# Patient Record
Sex: FEMALE | Race: BLACK OR AFRICAN AMERICAN | NOT HISPANIC OR LATINO | URBAN - METROPOLITAN AREA
[De-identification: names, ages, dates, MRNs, and addresses within clinical notes are randomized per-mention and may not be internally consistent; named-entity substitution may affect disease eponyms.]

---

## 2017-08-29 ENCOUNTER — EMERGENCY (EMERGENCY)
Facility: HOSPITAL | Age: 42
LOS: 1 days | Discharge: OTHER INSTITUTION | End: 2017-08-29
Attending: EMERGENCY MEDICINE
Payer: MEDICAID

## 2017-08-29 VITALS
OXYGEN SATURATION: 97 % | RESPIRATION RATE: 20 BRPM | TEMPERATURE: 98 F | DIASTOLIC BLOOD PRESSURE: 74 MMHG | SYSTOLIC BLOOD PRESSURE: 132 MMHG | WEIGHT: 175.05 LBS | HEIGHT: 69 IN | HEART RATE: 78 BPM

## 2017-08-29 LAB
ALBUMIN SERPL ELPH-MCNC: 3.8 G/DL — SIGNIFICANT CHANGE UP (ref 3.3–5.2)
ALP SERPL-CCNC: 84 U/L — SIGNIFICANT CHANGE UP (ref 40–120)
ALT FLD-CCNC: 16 U/L — SIGNIFICANT CHANGE UP
AMPHET UR-MCNC: NEGATIVE — SIGNIFICANT CHANGE UP
ANION GAP SERPL CALC-SCNC: 14 MMOL/L — SIGNIFICANT CHANGE UP (ref 5–17)
APAP SERPL-MCNC: <7.5 UG/ML — LOW (ref 10–26)
APPEARANCE UR: CLEAR — SIGNIFICANT CHANGE UP
AST SERPL-CCNC: 14 U/L — SIGNIFICANT CHANGE UP
BARBITURATES UR SCN-MCNC: NEGATIVE — SIGNIFICANT CHANGE UP
BASOPHILS # BLD AUTO: 0 K/UL — SIGNIFICANT CHANGE UP (ref 0–0.2)
BASOPHILS NFR BLD AUTO: 0.4 % — SIGNIFICANT CHANGE UP (ref 0–2)
BENZODIAZ UR-MCNC: NEGATIVE — SIGNIFICANT CHANGE UP
BILIRUB SERPL-MCNC: 0.4 MG/DL — SIGNIFICANT CHANGE UP (ref 0.4–2)
BILIRUB UR-MCNC: NEGATIVE — SIGNIFICANT CHANGE UP
BUN SERPL-MCNC: 6 MG/DL — LOW (ref 8–20)
CALCIUM SERPL-MCNC: 8.7 MG/DL — SIGNIFICANT CHANGE UP (ref 8.6–10.2)
CHLORIDE SERPL-SCNC: 102 MMOL/L — SIGNIFICANT CHANGE UP (ref 98–107)
CO2 SERPL-SCNC: 23 MMOL/L — SIGNIFICANT CHANGE UP (ref 22–29)
COCAINE METAB.OTHER UR-MCNC: POSITIVE
COLOR SPEC: YELLOW — SIGNIFICANT CHANGE UP
CREAT SERPL-MCNC: 0.56 MG/DL — SIGNIFICANT CHANGE UP (ref 0.5–1.3)
DIFF PNL FLD: NEGATIVE — SIGNIFICANT CHANGE UP
EOSINOPHIL # BLD AUTO: 0.1 K/UL — SIGNIFICANT CHANGE UP (ref 0–0.5)
EOSINOPHIL NFR BLD AUTO: 1.6 % — SIGNIFICANT CHANGE UP (ref 0–6)
EPI CELLS # UR: SIGNIFICANT CHANGE UP
ETHANOL SERPL-MCNC: <10 MG/DL — SIGNIFICANT CHANGE UP
GLUCOSE SERPL-MCNC: 86 MG/DL — SIGNIFICANT CHANGE UP (ref 70–115)
GLUCOSE UR QL: NEGATIVE MG/DL — SIGNIFICANT CHANGE UP
HCG UR QL: NEGATIVE — SIGNIFICANT CHANGE UP
HCT VFR BLD CALC: 34.5 % — LOW (ref 37–47)
HGB BLD-MCNC: 11.5 G/DL — LOW (ref 12–16)
KETONES UR-MCNC: ABNORMAL
LEUKOCYTE ESTERASE UR-ACNC: ABNORMAL
LYMPHOCYTES # BLD AUTO: 2.6 K/UL — SIGNIFICANT CHANGE UP (ref 1–4.8)
LYMPHOCYTES # BLD AUTO: 36.9 % — SIGNIFICANT CHANGE UP (ref 20–55)
MCHC RBC-ENTMCNC: 30.1 PG — SIGNIFICANT CHANGE UP (ref 27–31)
MCHC RBC-ENTMCNC: 33.3 G/DL — SIGNIFICANT CHANGE UP (ref 32–36)
MCV RBC AUTO: 90.3 FL — SIGNIFICANT CHANGE UP (ref 81–99)
METHADONE UR-MCNC: NEGATIVE — SIGNIFICANT CHANGE UP
MONOCYTES # BLD AUTO: 1 K/UL — HIGH (ref 0–0.8)
MONOCYTES NFR BLD AUTO: 14 % — HIGH (ref 3–10)
NEUTROPHILS # BLD AUTO: 3.3 K/UL — SIGNIFICANT CHANGE UP (ref 1.8–8)
NEUTROPHILS NFR BLD AUTO: 47.1 % — SIGNIFICANT CHANGE UP (ref 37–73)
NITRITE UR-MCNC: NEGATIVE — SIGNIFICANT CHANGE UP
OPIATES UR-MCNC: NEGATIVE — SIGNIFICANT CHANGE UP
PCP SPEC-MCNC: SIGNIFICANT CHANGE UP
PCP UR-MCNC: NEGATIVE — SIGNIFICANT CHANGE UP
PH UR: 5 — SIGNIFICANT CHANGE UP (ref 5–8)
PLATELET # BLD AUTO: 318 K/UL — SIGNIFICANT CHANGE UP (ref 150–400)
POTASSIUM SERPL-MCNC: 3.4 MMOL/L — LOW (ref 3.5–5.3)
POTASSIUM SERPL-SCNC: 3.4 MMOL/L — LOW (ref 3.5–5.3)
PROT SERPL-MCNC: 7.2 G/DL — SIGNIFICANT CHANGE UP (ref 6.6–8.7)
PROT UR-MCNC: NEGATIVE MG/DL — SIGNIFICANT CHANGE UP
RBC # BLD: 3.82 M/UL — LOW (ref 4.4–5.2)
RBC # FLD: 14.1 % — SIGNIFICANT CHANGE UP (ref 11–15.6)
RBC CASTS # UR COMP ASSIST: NEGATIVE /HPF — SIGNIFICANT CHANGE UP (ref 0–4)
SALICYLATES SERPL-MCNC: <2 MG/DL — LOW (ref 10–20)
SODIUM SERPL-SCNC: 139 MMOL/L — SIGNIFICANT CHANGE UP (ref 135–145)
SP GR SPEC: 1.01 — SIGNIFICANT CHANGE UP (ref 1.01–1.02)
THC UR QL: POSITIVE
UROBILINOGEN FLD QL: NEGATIVE MG/DL — SIGNIFICANT CHANGE UP
WBC # BLD: 6.9 K/UL — SIGNIFICANT CHANGE UP (ref 4.8–10.8)
WBC # FLD AUTO: 6.9 K/UL — SIGNIFICANT CHANGE UP (ref 4.8–10.8)
WBC UR QL: SIGNIFICANT CHANGE UP

## 2017-08-29 PROCEDURE — 99285 EMERGENCY DEPT VISIT HI MDM: CPT

## 2017-08-29 PROCEDURE — 93010 ELECTROCARDIOGRAM REPORT: CPT

## 2017-08-29 NOTE — ED PROVIDER NOTE - PMH
Depression    Non compliance w medication regimen    Schizophrenia    Suicidal behavior  Cutting with OD in past

## 2017-08-29 NOTE — ED PROVIDER NOTE - OBJECTIVE STATEMENT
41 year old female, with hx of depression, presenting to the ED for suicidal ideation. Pt states that she wants to "hurt self all the time", but denies having any HI. She denies having any nausea, vomiting, fever, or chills. Pt states that she has no other PMHx. She denies use of tobacco, alcohol, or illicit drugs, but as per triage note, pt "smoked crack last night". No further complaints at this time.

## 2017-08-29 NOTE — ED ADULT NURSE NOTE - OBJECTIVE STATEMENT
pt a+ox4, presents to ED requesting BH eval. pt states she "isn't feeling right and needs psych eval." pt denies chest pain, SOB. pt denies recent fever, chills or night sweats. pt states she is hungry and wants to be evaluated. per day RN, pt has hx of schizophrenia and not taking medication. pt denies SI/HI. blood work, UA and EKG complete. per MD pt given meal and cleared for BH.

## 2017-08-29 NOTE — ED PROVIDER NOTE - CONSTITUTIONAL, MLM
normal... Slightly disheveled, non-toxic appearing. awake, alert, oriented to person, place, time/situation and in no apparent distress.

## 2017-08-29 NOTE — ED ADULT NURSE NOTE - CAS DISCH CONDITION
Stable/Pt alert and oriented, friendly and cooporetive. Pt 's belongings accompanied pt. No distress noted. No  hallucinations noted at this time. Denies pain. Voided  prior to leaving. Cooporative and friendly with transport team.

## 2017-08-29 NOTE — ED BEHAVIORAL HEALTH ASSESSMENT NOTE - SUMMARY
Pt. reports she has a long hx of mental illness with multiple psych hospitalizations, recently kicked out of sisters house, non compliant with meds and now homeless, c/o SI with intent and plna to "do a lot of drugs". Cannot contract for safety. Denies active AVH/delusions. Requires inpatient hospitalization.

## 2017-08-29 NOTE — ED ADULT TRIAGE NOTE - CHIEF COMPLAINT QUOTE
" I smoked crack last night and got a bad bunch , Im hungry" pt states she is homeless and watns some food denies overdose

## 2017-08-29 NOTE — ED BEHAVIORAL HEALTH ASSESSMENT NOTE - OTHER PAST PSYCHIATRIC HISTORY (INCLUDE DETAILS REGARDING ONSET, COURSE OF ILLNESS, INPATIENT/OUTPATIENT TREATMENT)
hospitalizations at [facility], [facility] and [place].  Reports long hx of mental illness.

## 2017-08-29 NOTE — ED BEHAVIORAL HEALTH ASSESSMENT NOTE - HPI (INCLUDE ILLNESS QUALITY, SEVERITY, DURATION, TIMING, CONTEXT, MODIFYING FACTORS, ASSOCIATED SIGNS AND SYMPTOMS)
41yo female, hx of schizoaffective d/o, reports being homeless since sister kicked her out, reports crack cocaine use and hasn't taken her medication in 2 weeks, denies abuse/trauma, BIBA activated by herself after having thoughts of hurting herself. She reports long hx of mental illness with multiple hospitalizations. Her main stressor is housing and negative intrusive thoughts. She denies AVH/paranoia or s/s of lia. Denies difficulty with sleep or appetite. She reports feeling depressed and intent on killing herself by "doing a lot of drugs". She cannot contract for safety and feels hopeless.

## 2017-08-30 VITALS
RESPIRATION RATE: 20 BRPM | OXYGEN SATURATION: 96 % | HEART RATE: 87 BPM | SYSTOLIC BLOOD PRESSURE: 110 MMHG | TEMPERATURE: 98 F | DIASTOLIC BLOOD PRESSURE: 75 MMHG

## 2017-08-30 PROCEDURE — 80307 DRUG TEST PRSMV CHEM ANLYZR: CPT

## 2017-08-30 PROCEDURE — 80053 COMPREHEN METABOLIC PANEL: CPT

## 2017-08-30 PROCEDURE — 36415 COLL VENOUS BLD VENIPUNCTURE: CPT

## 2017-08-30 PROCEDURE — 85027 COMPLETE CBC AUTOMATED: CPT

## 2017-08-30 PROCEDURE — 81001 URINALYSIS AUTO W/SCOPE: CPT

## 2017-08-30 PROCEDURE — 99285 EMERGENCY DEPT VISIT HI MDM: CPT | Mod: 25

## 2017-08-30 PROCEDURE — 93005 ELECTROCARDIOGRAM TRACING: CPT

## 2017-08-30 PROCEDURE — 81025 URINE PREGNANCY TEST: CPT

## 2017-08-30 RX ORDER — POTASSIUM CHLORIDE 20 MEQ
40 PACKET (EA) ORAL ONCE
Qty: 0 | Refills: 0 | Status: COMPLETED | OUTPATIENT
Start: 2017-08-30 | End: 2017-08-30

## 2017-08-30 RX ADMIN — Medication 40 MILLIEQUIVALENT(S): at 05:28

## 2017-08-30 NOTE — ED ADULT NURSE REASSESSMENT NOTE - CONDITION
improved/Pt complained of a left leg being itchy. hard, red,  round raised hot area noted on left outer thigh. Pt states she had been scratching it. Ice pack given.  Pt states she thinks she was bitten by a bug in her bed at home. Recheck redness gone. Pt states feeling better. In bed sleeping.

## 2017-08-30 NOTE — ED ADULT NURSE REASSESSMENT NOTE - GENERAL PATIENT STATE
comfortable appearance
comfortable appearance/cooperative
comfortable appearance/resting/sleeping
resting/sleeping/comfortable appearance

## 2017-08-30 NOTE — ED ADULT NURSE REASSESSMENT NOTE - NS ED NURSE REASSESS COMMENT FT1
pt refused 3am vitals, despite health teaching, in no acute distress, will continue to monitor and maintain safety.
pt currently resting offers no complaints in  no acute distress at this present time. Pt will stay overnight for bed in the morning, will continue to monitor and maintain safety.
pt arrived to , pleasant complaint/ calm and cooperative with  policy and procedure, wanded by security.  Pt states she has been smoking crack and marijuana, in no acute distress or withdrawals at this present time.  Pt states having suicidal ideations, with no plan, denies HI and denies A/V/T hallucinations.  Pt states she is depressed and has been off psych meds for a while, seeking inpatient.  No aggressive behaviors noted at this present time.  Will continue to monitor and maintain safety.
Pt. currently resting offers no complaints at this present time, in no acute distress at this time.  Will continue to monitor and maintain safety.

## 2017-08-30 NOTE — ED BEHAVIORAL HEALTH NOTE - BEHAVIORAL HEALTH NOTE
ISABELLA Note: Pt will be transferred to [facility] [place] for inpt psychiatric tx. Pt in agreement and signed vol. admission form. Accepting Dr. Adonay FELDER. Ambulance arranged with [facility]. No precert needed, straight medicaid.

## 2017-08-30 NOTE — ED ADULT NURSE REASSESSMENT NOTE - CONDITION
unchanged/Pt asleep on initial rounds. Reports she is tired and requests sleep. Denies any painand refused any comfort care at this time. Lightes dimmed. Suport and encouragement given. Awaiting placement.

## 2017-09-17 ENCOUNTER — EMERGENCY (EMERGENCY)
Facility: HOSPITAL | Age: 42
LOS: 1 days | Discharge: OTHER INSTITUTION | End: 2017-09-17
Attending: EMERGENCY MEDICINE
Payer: MEDICAID

## 2017-09-17 VITALS
SYSTOLIC BLOOD PRESSURE: 174 MMHG | WEIGHT: 175.05 LBS | HEART RATE: 91 BPM | RESPIRATION RATE: 18 BRPM | DIASTOLIC BLOOD PRESSURE: 81 MMHG | OXYGEN SATURATION: 99 % | HEIGHT: 69 IN | TEMPERATURE: 98 F

## 2017-09-17 VITALS
DIASTOLIC BLOOD PRESSURE: 65 MMHG | RESPIRATION RATE: 19 BRPM | HEART RATE: 84 BPM | SYSTOLIC BLOOD PRESSURE: 102 MMHG | OXYGEN SATURATION: 98 % | TEMPERATURE: 99 F

## 2017-09-17 LAB
ALBUMIN SERPL ELPH-MCNC: 3.8 G/DL — SIGNIFICANT CHANGE UP (ref 3.3–5.2)
ALP SERPL-CCNC: 80 U/L — SIGNIFICANT CHANGE UP (ref 40–120)
ALT FLD-CCNC: 14 U/L — SIGNIFICANT CHANGE UP
AMPHET UR-MCNC: NEGATIVE — SIGNIFICANT CHANGE UP
ANION GAP SERPL CALC-SCNC: 11 MMOL/L — SIGNIFICANT CHANGE UP (ref 5–17)
APAP SERPL-MCNC: <7.5 UG/ML — LOW (ref 10–26)
AST SERPL-CCNC: 15 U/L — SIGNIFICANT CHANGE UP
BARBITURATES UR SCN-MCNC: NEGATIVE — SIGNIFICANT CHANGE UP
BASOPHILS # BLD AUTO: 0 K/UL — SIGNIFICANT CHANGE UP (ref 0–0.2)
BASOPHILS NFR BLD AUTO: 0.6 % — SIGNIFICANT CHANGE UP (ref 0–2)
BENZODIAZ UR-MCNC: NEGATIVE — SIGNIFICANT CHANGE UP
BILIRUB SERPL-MCNC: 0.3 MG/DL — LOW (ref 0.4–2)
BUN SERPL-MCNC: 7 MG/DL — LOW (ref 8–20)
CALCIUM SERPL-MCNC: 9.3 MG/DL — SIGNIFICANT CHANGE UP (ref 8.6–10.2)
CHLORIDE SERPL-SCNC: 101 MMOL/L — SIGNIFICANT CHANGE UP (ref 98–107)
CO2 SERPL-SCNC: 26 MMOL/L — SIGNIFICANT CHANGE UP (ref 22–29)
COCAINE METAB.OTHER UR-MCNC: NEGATIVE — SIGNIFICANT CHANGE UP
CREAT SERPL-MCNC: 0.7 MG/DL — SIGNIFICANT CHANGE UP (ref 0.5–1.3)
EOSINOPHIL # BLD AUTO: 0.2 K/UL — SIGNIFICANT CHANGE UP (ref 0–0.5)
EOSINOPHIL NFR BLD AUTO: 3.2 % — SIGNIFICANT CHANGE UP (ref 0–6)
GLUCOSE SERPL-MCNC: 104 MG/DL — SIGNIFICANT CHANGE UP (ref 70–115)
HCT VFR BLD CALC: 37.5 % — SIGNIFICANT CHANGE UP (ref 37–47)
HGB BLD-MCNC: 12.5 G/DL — SIGNIFICANT CHANGE UP (ref 12–16)
LYMPHOCYTES # BLD AUTO: 2.6 K/UL — SIGNIFICANT CHANGE UP (ref 1–4.8)
LYMPHOCYTES # BLD AUTO: 47.9 % — SIGNIFICANT CHANGE UP (ref 20–55)
MCHC RBC-ENTMCNC: 29.9 PG — SIGNIFICANT CHANGE UP (ref 27–31)
MCHC RBC-ENTMCNC: 33.3 G/DL — SIGNIFICANT CHANGE UP (ref 32–36)
MCV RBC AUTO: 89.7 FL — SIGNIFICANT CHANGE UP (ref 81–99)
METHADONE UR-MCNC: NEGATIVE — SIGNIFICANT CHANGE UP
MONOCYTES # BLD AUTO: 0.7 K/UL — SIGNIFICANT CHANGE UP (ref 0–0.8)
MONOCYTES NFR BLD AUTO: 12.7 % — HIGH (ref 3–10)
NEUTROPHILS # BLD AUTO: 1.9 K/UL — SIGNIFICANT CHANGE UP (ref 1.8–8)
NEUTROPHILS NFR BLD AUTO: 35.6 % — LOW (ref 37–73)
OPIATES UR-MCNC: NEGATIVE — SIGNIFICANT CHANGE UP
PCP SPEC-MCNC: SIGNIFICANT CHANGE UP
PCP UR-MCNC: NEGATIVE — SIGNIFICANT CHANGE UP
PLATELET # BLD AUTO: 406 K/UL — HIGH (ref 150–400)
POTASSIUM SERPL-MCNC: 3.8 MMOL/L — SIGNIFICANT CHANGE UP (ref 3.5–5.3)
POTASSIUM SERPL-SCNC: 3.8 MMOL/L — SIGNIFICANT CHANGE UP (ref 3.5–5.3)
PROT SERPL-MCNC: 7.6 G/DL — SIGNIFICANT CHANGE UP (ref 6.6–8.7)
RBC # BLD: 4.18 M/UL — LOW (ref 4.4–5.2)
RBC # FLD: 14.3 % — SIGNIFICANT CHANGE UP (ref 11–15.6)
SALICYLATES SERPL-MCNC: <2 MG/DL — LOW (ref 10–20)
SODIUM SERPL-SCNC: 138 MMOL/L — SIGNIFICANT CHANGE UP (ref 135–145)
THC UR QL: POSITIVE
WBC # BLD: 5.4 K/UL — SIGNIFICANT CHANGE UP (ref 4.8–10.8)
WBC # FLD AUTO: 5.4 K/UL — SIGNIFICANT CHANGE UP (ref 4.8–10.8)

## 2017-09-17 PROCEDURE — 99285 EMERGENCY DEPT VISIT HI MDM: CPT

## 2017-09-17 PROCEDURE — 80307 DRUG TEST PRSMV CHEM ANLYZR: CPT

## 2017-09-17 PROCEDURE — 93010 ELECTROCARDIOGRAM REPORT: CPT

## 2017-09-17 PROCEDURE — 36415 COLL VENOUS BLD VENIPUNCTURE: CPT

## 2017-09-17 PROCEDURE — 99285 EMERGENCY DEPT VISIT HI MDM: CPT | Mod: 25

## 2017-09-17 PROCEDURE — 80053 COMPREHEN METABOLIC PANEL: CPT

## 2017-09-17 PROCEDURE — 85027 COMPLETE CBC AUTOMATED: CPT

## 2017-09-17 PROCEDURE — 93005 ELECTROCARDIOGRAM TRACING: CPT

## 2017-09-17 RX ORDER — DIVALPROEX SODIUM 500 MG/1
750 TABLET, DELAYED RELEASE ORAL
Qty: 0 | Refills: 0 | COMMUNITY

## 2017-09-17 RX ORDER — DIVALPROEX SODIUM 500 MG/1
1 TABLET, DELAYED RELEASE ORAL
Qty: 0 | Refills: 0 | COMMUNITY

## 2017-09-17 RX ORDER — HALOPERIDOL DECANOATE 100 MG/ML
0 INJECTION INTRAMUSCULAR
Qty: 0 | Refills: 0 | COMMUNITY

## 2017-09-17 NOTE — ED BEHAVIORAL HEALTH ASSESSMENT NOTE - OTHER PAST PSYCHIATRIC HISTORY (INCLUDE DETAILS REGARDING ONSET, COURSE OF ILLNESS, INPATIENT/OUTPATIENT TREATMENT)
Most recent psych hospitalization at [facility] in April.  Also hospitalized at [facility], [facility] and [place].  Reports long hx of mental illness.
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
## 2017-09-17 NOTE — ED ADULT NURSE NOTE - NSHISCREENINGSIGNS_ED_A_ED
socially isolated, outcast or withdrawn/victimized or treated badly by peers/reacting to disappointments, criticisms or teasing with extreme and intense anger, blame or a desire for revenge/feelings and behavior are easily influenced by peers/dwelling on experiences of rejection, on injustices or unrealistic fears

## 2017-09-17 NOTE — ED BEHAVIORAL HEALTH ASSESSMENT NOTE - DETAILS
[place] - discharged 8/30 As per HPI patient present Dr. Domínguez aware Attempted reaching Dr. Calvert [phone] ext [phone] Uncle-schizophrenia
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
## 2017-09-17 NOTE — ED ADULT NURSE NOTE - NSSISCREENINGSIGNS_ED_A_ED
command hallucinations to hurt self/social withdrawal- from friends/family/society/mood changes- often dramatic/purposeless- no reason for living/hopelessness/history of abuse/trauma/non-complaint with treatments/triggering event  (ex. pending homelessness / incarceration)/talking about suicide/anxiety/agitation/anger- uncontrolled/past suicide attempts/ family

## 2017-09-17 NOTE — ED ADULT NURSE REASSESSMENT NOTE - NS ED NURSE REASSESS COMMENT FT1
Assumed care of pt @1930. Pt reports current suicidal ideations. Pt refuses to answer any further questions at this time. As per pt, "I don't want to talk about it. I am feeling suicidal." Pt cooperative with blood draw. Pt  resting/sleeping comfortably. Comfort measures provided to pt. Will continue to monitor the pt for safety.

## 2017-09-17 NOTE — ED BEHAVIORAL HEALTH ASSESSMENT NOTE - RISK ASSESSMENT
High risk - patient has chronic psychiatric history, with current suicidal ideation with intent and not engaged in safety planning. See summary for additional information
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
## 2017-09-17 NOTE — ED BEHAVIORAL HEALTH NOTE - BEHAVIORAL HEALTH NOTE
SWNote: pt evaluated by psych NP (French) found to benefit from inpatient psych hospitalization. Worker called [facility] female bed available. Worker to fax all needed docs for case review. Worker will contacted with review outcome. SW to follow. SWNote: pt evaluated by psych NP (French) found to benefit from inpatient psych hospitalization,on 9.27 status. Worker called [facility] female bed available. Worker to fax all needed docs for case review. Worker will contacted with review outcome. SW to follow. SWNote: pt evaluated by psych NP (French) found to benefit from inpatient psych hospitalization,on 9.27 status. Worker called [facility] ([place]) female bed available. Worker to fax all needed docs for case review. Worker will contacted with review outcome. SW to follow.  Phone call from Elizabeth at [facility] requesting clarification about pt's arrest status, worker spoke with officer Ines foster [phone] whom states pt not arrested/will be given a ticket to present herself in court at the date written in the ticket,pt will have to f/u accordingly. Worker informed Elizabeth about the above,verbalized understanding.

## 2017-09-17 NOTE — ED PROVIDER NOTE - MEDICAL DECISION MAKING DETAILS
PT WITH SI VS HI. HERE WITH PD. SENT TO PSYCH FOR EVALUATION . WILL FOLLOW PT WITH SI VS HI. HERE WITH PD. SENT TO PSYCH FOR EVALUATION . WILL FOLLOW  ASPER PSYCH TRANSFER TO INPATIENT PSYCH

## 2017-09-17 NOTE — ED BEHAVIORAL HEALTH ASSESSMENT NOTE - PSYCHIATRIC ISSUES AND PLAN (INCLUDE STANDING AND PRN MEDICATION)
Depakote 750 mg twice daily. Risperidone 2 mg twice daily. Cogentin 1 mg twice daily. Haldol 5 mg PO/IM, Ativan 2 mg PO/IM, and Benadryl 50 mg PO/IM PRN Q6H for agitation.

## 2018-02-04 ENCOUNTER — EMERGENCY (EMERGENCY)
Facility: HOSPITAL | Age: 43
LOS: 1 days | Discharge: HOME | End: 2018-02-04
Attending: EMERGENCY MEDICINE | Admitting: EMERGENCY MEDICINE
Payer: MEDICAID

## 2018-02-04 VITALS
DIASTOLIC BLOOD PRESSURE: 71 MMHG | HEART RATE: 98 BPM | TEMPERATURE: 98 F | SYSTOLIC BLOOD PRESSURE: 121 MMHG | RESPIRATION RATE: 17 BRPM | OXYGEN SATURATION: 100 %

## 2018-02-04 PROCEDURE — 99282 EMERGENCY DEPT VISIT SF MDM: CPT | Mod: 25

## 2018-02-04 NOTE — ED ADULT TRIAGE NOTE - CHIEF COMPLAINT QUOTE
pt with pmh of schizophrenia states she is not "thinking correctly" states "im homeless and I am out of cigarets and I don't even have a lighter"   NP called, pt to go directly back.

## 2018-02-05 VITALS
OXYGEN SATURATION: 100 % | RESPIRATION RATE: 16 BRPM | SYSTOLIC BLOOD PRESSURE: 122 MMHG | DIASTOLIC BLOOD PRESSURE: 80 MMHG | TEMPERATURE: 98 F | HEART RATE: 90 BPM

## 2018-02-05 NOTE — PROVIDER CONTACT NOTE (OTHER) - ASSESSMENT
Pt is homeless and needs to return to shelter in [facility].  Mas Called for auth but phone busy.  senior ride called to set up transportation.  trip time is 11am, trip# [id number] and logistic care # [id number].

## 2018-02-05 NOTE — ED PROVIDER NOTE - MEDICAL DECISION MAKING DETAILS
pt requesting place to live and to see sw. Has no medical complaints. no si/hi, hallucinations or delusions requiring ED psych eval at this time. Will await SW arrival in AM. no need for bloods.

## 2018-02-05 NOTE — ED ADULT NURSE NOTE - OBJECTIVE STATEMENT
pt received to room  pt comes to ED by EMS for a "place to stay forever" pt is home less. pt denies si/hi ETOH or drugs. pt called 911 because she wants a place to stay. pt has  hx of TBI and psych hx. pt VSS pt is calm and cooperative. ED Isamar made aware of patient awaiting eval. pt undressed belongings secured by PES Otoniel. Food and blanket provided. Psych attending made aware of patient. comfort and safety measures maintained awaiting further orders Will continue to monitor the pt

## 2018-02-05 NOTE — ED ADULT NURSE REASSESSMENT NOTE - NS ED NURSE REASSESS COMMENT FT1
pt cleared medically by EDMD attending vee. pt is awaiting SW in the morning for placement. belongings returned to patient by PES pt is cleared to go to waiting as per attending. pt went to waiting room with PES

## 2018-03-01 ENCOUNTER — EMERGENCY (EMERGENCY)
Facility: HOSPITAL | Age: 43
LOS: 1 days | Discharge: HOME | End: 2018-03-01
Attending: EMERGENCY MEDICINE | Admitting: EMERGENCY MEDICINE
Payer: COMMERCIAL

## 2018-03-01 VITALS
SYSTOLIC BLOOD PRESSURE: 130 MMHG | TEMPERATURE: 98 F | DIASTOLIC BLOOD PRESSURE: 83 MMHG | OXYGEN SATURATION: 98 % | HEIGHT: 69 IN | HEART RATE: 111 BPM | RESPIRATION RATE: 18 BRPM | WEIGHT: 179.9 LBS

## 2018-03-01 PROCEDURE — 99284 EMERGENCY DEPT VISIT MOD MDM: CPT | Mod: 25

## 2018-03-01 NOTE — ED ADULT TRIAGE NOTE - CHIEF COMPLAINT QUOTE
Pt states "I've been smokin crack and reefer and wanted to kill myself, I haven't been taking my meds", admits to drinking alcohol, denies plan, denies HI at this time

## 2018-03-02 ENCOUNTER — EMERGENCY (EMERGENCY)
Facility: HOSPITAL | Age: 43
LOS: 1 days | Discharge: HOME | End: 2018-03-02
Attending: EMERGENCY MEDICINE
Payer: COMMERCIAL

## 2018-03-02 VITALS — HEART RATE: 129 BPM

## 2018-03-02 VITALS — WEIGHT: 205.91 LBS | HEIGHT: 69 IN

## 2018-03-02 VITALS
HEART RATE: 123 BPM | RESPIRATION RATE: 20 BRPM | SYSTOLIC BLOOD PRESSURE: 108 MMHG | TEMPERATURE: 99 F | OXYGEN SATURATION: 97 % | DIASTOLIC BLOOD PRESSURE: 65 MMHG

## 2018-03-02 DIAGNOSIS — F25.8 OTHER SCHIZOAFFECTIVE DISORDERS: ICD-10-CM

## 2018-03-02 LAB
ALBUMIN SERPL ELPH-MCNC: 3.4 G/DL — SIGNIFICANT CHANGE UP (ref 3.3–5.2)
ALP SERPL-CCNC: 104 U/L — SIGNIFICANT CHANGE UP (ref 40–120)
ALT FLD-CCNC: 22 U/L — SIGNIFICANT CHANGE UP
AMPHET UR-MCNC: NEGATIVE — SIGNIFICANT CHANGE UP
ANION GAP SERPL CALC-SCNC: 20 MMOL/L — HIGH (ref 5–17)
APPEARANCE UR: ABNORMAL
AST SERPL-CCNC: 23 U/L — SIGNIFICANT CHANGE UP
BACTERIA # UR AUTO: ABNORMAL
BARBITURATES UR SCN-MCNC: NEGATIVE — SIGNIFICANT CHANGE UP
BENZODIAZ UR-MCNC: NEGATIVE — SIGNIFICANT CHANGE UP
BILIRUB SERPL-MCNC: 0.4 MG/DL — SIGNIFICANT CHANGE UP (ref 0.4–2)
BILIRUB UR-MCNC: NEGATIVE — SIGNIFICANT CHANGE UP
BUN SERPL-MCNC: 6 MG/DL — LOW (ref 8–20)
CALCIUM SERPL-MCNC: 9.4 MG/DL — SIGNIFICANT CHANGE UP (ref 8.6–10.2)
CHLORIDE SERPL-SCNC: 97 MMOL/L — LOW (ref 98–107)
CK MB CFR SERPL CALC: 4 NG/ML — SIGNIFICANT CHANGE UP (ref 0–6.7)
CK SERPL-CCNC: 449 U/L — HIGH (ref 25–170)
CO2 SERPL-SCNC: 16 MMOL/L — LOW (ref 22–29)
COCAINE METAB.OTHER UR-MCNC: POSITIVE
COLOR SPEC: YELLOW — SIGNIFICANT CHANGE UP
CREAT SERPL-MCNC: 0.52 MG/DL — SIGNIFICANT CHANGE UP (ref 0.5–1.3)
DIFF PNL FLD: ABNORMAL
EOSINOPHIL NFR BLD AUTO: 2 % — SIGNIFICANT CHANGE UP (ref 0–5)
EPI CELLS # UR: ABNORMAL
ETHANOL SERPL-MCNC: <10 MG/DL — SIGNIFICANT CHANGE UP
GLUCOSE SERPL-MCNC: 97 MG/DL — SIGNIFICANT CHANGE UP (ref 70–115)
GLUCOSE UR QL: NEGATIVE MG/DL — SIGNIFICANT CHANGE UP
HCG UR QL: NEGATIVE — SIGNIFICANT CHANGE UP
HCT VFR BLD CALC: 38.4 % — SIGNIFICANT CHANGE UP (ref 37–47)
HGB BLD-MCNC: 12.5 G/DL — SIGNIFICANT CHANGE UP (ref 12–16)
KETONES UR-MCNC: ABNORMAL
LEUKOCYTE ESTERASE UR-ACNC: ABNORMAL
LYMPHOCYTES # BLD AUTO: 23 % — SIGNIFICANT CHANGE UP (ref 20–55)
MAGNESIUM SERPL-MCNC: 1.8 MG/DL — SIGNIFICANT CHANGE UP (ref 1.6–2.6)
MCHC RBC-ENTMCNC: 27.4 PG — SIGNIFICANT CHANGE UP (ref 27–31)
MCHC RBC-ENTMCNC: 32.6 G/DL — SIGNIFICANT CHANGE UP (ref 32–36)
MCV RBC AUTO: 84 FL — SIGNIFICANT CHANGE UP (ref 81–99)
METHADONE UR-MCNC: NEGATIVE — SIGNIFICANT CHANGE UP
MONOCYTES NFR BLD AUTO: 19 % — HIGH (ref 3–10)
NEUTROPHILS NFR BLD AUTO: 56 % — SIGNIFICANT CHANGE UP (ref 37–73)
NITRITE UR-MCNC: NEGATIVE — SIGNIFICANT CHANGE UP
OPIATES UR-MCNC: NEGATIVE — SIGNIFICANT CHANGE UP
PCP SPEC-MCNC: SIGNIFICANT CHANGE UP
PCP UR-MCNC: NEGATIVE — SIGNIFICANT CHANGE UP
PH UR: 6 — SIGNIFICANT CHANGE UP (ref 5–8)
PLAT MORPH BLD: NORMAL — SIGNIFICANT CHANGE UP
PLATELET # BLD AUTO: 282 K/UL — SIGNIFICANT CHANGE UP (ref 150–400)
PLATELET COUNT - ESTIMATE: ADEQUATE — SIGNIFICANT CHANGE UP
POTASSIUM SERPL-MCNC: 4 MMOL/L — SIGNIFICANT CHANGE UP (ref 3.5–5.3)
POTASSIUM SERPL-SCNC: 4 MMOL/L — SIGNIFICANT CHANGE UP (ref 3.5–5.3)
PROT SERPL-MCNC: 7.5 G/DL — SIGNIFICANT CHANGE UP (ref 6.6–8.7)
PROT UR-MCNC: NEGATIVE MG/DL — SIGNIFICANT CHANGE UP
RBC # BLD: 4.57 M/UL — SIGNIFICANT CHANGE UP (ref 4.4–5.2)
RBC # FLD: 15.1 % — SIGNIFICANT CHANGE UP (ref 11–15.6)
RBC BLD AUTO: NORMAL — SIGNIFICANT CHANGE UP
RBC CASTS # UR COMP ASSIST: SIGNIFICANT CHANGE UP /HPF (ref 0–4)
SODIUM SERPL-SCNC: 133 MMOL/L — LOW (ref 135–145)
SP GR SPEC: 1.01 — SIGNIFICANT CHANGE UP (ref 1.01–1.02)
THC UR QL: POSITIVE
TROPONIN T SERPL-MCNC: <0.01 NG/ML — SIGNIFICANT CHANGE UP (ref 0–0.06)
TSH SERPL-MCNC: 1.38 UIU/ML — SIGNIFICANT CHANGE UP (ref 0.27–4.2)
UROBILINOGEN FLD QL: NEGATIVE MG/DL — SIGNIFICANT CHANGE UP
WBC # BLD: 9.2 K/UL — SIGNIFICANT CHANGE UP (ref 4.8–10.8)
WBC # FLD AUTO: 9.2 K/UL — SIGNIFICANT CHANGE UP (ref 4.8–10.8)
WBC UR QL: ABNORMAL

## 2018-03-02 PROCEDURE — 84484 ASSAY OF TROPONIN QUANT: CPT

## 2018-03-02 PROCEDURE — 81025 URINE PREGNANCY TEST: CPT

## 2018-03-02 PROCEDURE — 80053 COMPREHEN METABOLIC PANEL: CPT

## 2018-03-02 PROCEDURE — 83735 ASSAY OF MAGNESIUM: CPT

## 2018-03-02 PROCEDURE — 36415 COLL VENOUS BLD VENIPUNCTURE: CPT

## 2018-03-02 PROCEDURE — 82550 ASSAY OF CK (CPK): CPT

## 2018-03-02 PROCEDURE — 80307 DRUG TEST PRSMV CHEM ANLYZR: CPT

## 2018-03-02 PROCEDURE — 99284 EMERGENCY DEPT VISIT MOD MDM: CPT | Mod: 25

## 2018-03-02 PROCEDURE — 99285 EMERGENCY DEPT VISIT HI MDM: CPT

## 2018-03-02 PROCEDURE — 82553 CREATINE MB FRACTION: CPT

## 2018-03-02 PROCEDURE — 85027 COMPLETE CBC AUTOMATED: CPT

## 2018-03-02 PROCEDURE — 81001 URINALYSIS AUTO W/SCOPE: CPT

## 2018-03-02 PROCEDURE — 90792 PSYCH DIAG EVAL W/MED SRVCS: CPT

## 2018-03-02 PROCEDURE — 84443 ASSAY THYROID STIM HORMONE: CPT

## 2018-03-02 RX ORDER — DIVALPROEX SODIUM 500 MG/1
500 TABLET, DELAYED RELEASE ORAL ONCE
Qty: 0 | Refills: 0 | Status: COMPLETED | OUTPATIENT
Start: 2018-03-02 | End: 2018-03-02

## 2018-03-02 RX ADMIN — DIVALPROEX SODIUM 500 MILLIGRAM(S): 500 TABLET, DELAYED RELEASE ORAL at 13:18

## 2018-03-02 RX ADMIN — Medication 1 MILLIGRAM(S): at 04:08

## 2018-03-02 NOTE — ED STATDOCS - OBJECTIVE STATEMENT
Discharged from [facility] 2 days ago to [facility];  used check to purchase crack and reefer.  Hasn't taken medications in 2 days: symbalta, depakote and haldol.  Reports feeling depressed.  "I'm homeless".

## 2018-03-02 NOTE — ED BEHAVIORAL HEALTH ASSESSMENT NOTE - RISK ASSESSMENT
Chronic risk due to recent discharge from inpatient unit and multiple suicide attempts and hx of substance abuse. Acute risk due to drug binge. However patient denies suicidal and homicidal ideation, is not acutely depressed or psychotic , displays help seeking behavior, denies having guns. Patient assessed to not be at imminent risk of harm to self or others.

## 2018-03-02 NOTE — ED BEHAVIORAL HEALTH ASSESSMENT NOTE - DESCRIPTION (FIRST USE, LAST USE, QUANTITY, FREQUENCY, DURATION)
Whenever she can borrow cigarettes Whenever she can buy cigarettes reports having  1 beer since discharge uses cannabis when she can afford reports daily cocaine use when she can afford it, unable to quantify quantity, states she spends all of her money on it

## 2018-03-02 NOTE — ED PROVIDER NOTE - PHYSICAL EXAMINATION
Constitutional : Appears comfortably, talking in full sentences  Head :NC AT , no swelling  Eyes :eomi, no swelling  Mouth :mm moist,  Neck : supple, trachea in midline  Chest :Frederick air entry, symm chest expansion, no distress  Heart :S1 S2 distant  Abdomen :abd soft, non tender  Musc/Skel :ext no swelling, no deformity, no spine tenderness, distal pulses present  Neuro  :AAO 3 no focal deficits Constitutional : Appears comfortably, in no distress, multiple marks on skin, old and new scratches, poor dentition, appears disheveled   Head :NC AT , no swelling  Eyes :eomi, no swelling  Mouth :mm dry,  Neck : supple, trachea in midline  Chest :Frederick air entry, symm chest expansion, no distress  Heart :S1 S2 distant  Abdomen :abd soft, non tender  Musc/Skel :ext no swelling, no deformity, no spine tenderness, distal pulses present  Neuro  :AAO 3 no focal deficits  Psych: no hallucinations or delusions, flat affect, following commands

## 2018-03-02 NOTE — ED BEHAVIORAL HEALTH ASSESSMENT NOTE - DETAILS
reports recent psychiatric admission with discharge 2 days ago chrissy suicide attempt via overdose / cutting Uncle-schizophrenia na [place] contacted multiple suicide attempt via overdose / cutting

## 2018-03-02 NOTE — ED BEHAVIORAL HEALTH ASSESSMENT NOTE - SUMMARY
42 year-old female, with chronic psychiatric history, with history of chronic schizoaffective disorder, bipolar type, multiple prior in-patient hospitalization and ED visits, with 15 year [place] inpatient stay discharge in 2017,  multiple prior suicide attempts and psychiatric hospitalization  Patient reports noncompliance with medications after discharge from inpatient unit for the past 2 days.  She initially reported suicdial ideation however upon evaluation by psychiatrist denies suicidal ideation and states she is looking for a place to stay.   Patient displays occasional tangential thought process and thought blocking during evaluation , likely secondary to schizoaffective disorder versus change in mental status due to withdrawal from cocaine, patinet also shows lip trembling possibly triggered by cocaine withdrawal. 42 year-old female, with chronic psychiatric history, with history of chronic schizoaffective disorder, bipolar type, multiple prior in-patient hospitalization and ED visits, with 15 year [place] inpatient stay discharge in 2017,  multiple prior suicide attempts and psychiatric hospitalization  Patient reports noncompliance with medications after discharge from inpatient unit for the past 2 days.  She initially reported suicidal ideation however upon evaluation by psychiatrist denies suicidal ideation and states she is looking for a place to stay.   Patient displays occasional tangential thought process and thought blocking during evaluation , likely secondary to schizoaffective disorder versus change in mental status due to withdrawal from cocaine, patient also shows lip trembling possibly triggered by cocaine withdrawal.

## 2018-03-02 NOTE — ED BEHAVIORAL HEALTH ASSESSMENT NOTE - MEDICATIONS (PRESCRIPTIONS, DIRECTIONS)
continue as prescribed continue as prescribed; 1 month supply of depakote 250 mg BID and Cymbalta 60 mg daily called into [facility] [address] in [place] continue as prescribed; 1 month supply of depakotedr  250 mg BID and Cymbalta 60 mg daily called into [facility] [address] in [place]

## 2018-03-02 NOTE — ED BEHAVIORAL HEALTH ASSESSMENT NOTE - OTHER PAST PSYCHIATRIC HISTORY (INCLUDE DETAILS REGARDING ONSET, COURSE OF ILLNESS, INPATIENT/OUTPATIENT TREATMENT)
multiple hospitalizations  Also hospitalized at [facility][1] and [place].  Reports long hx of mental illness- schizoaffective disorder

## 2018-03-02 NOTE — CHART NOTE - NSCHARTNOTEFT_GEN_A_CORE
ISABELLA Note: pt has [facility] appt on 3/7/18 at 10:30 a.m. ISABELLA spoke with Zuleika at [facility] ([phone]) to coordinate emergency housing at [address] [place] [address] in [place].  Intern Karla Comer set up transport via [employer] to emergency housing in [place]. Confirmation #[id number]. pt to report to [facility] on Monday 3/5.

## 2018-03-02 NOTE — ED PROVIDER NOTE - MEDICAL DECISION MAKING DETAILS
41 y/o F 41 y/o F on antipsychotics, non-compliant with medication, here for SI, will do labs consult tele-psych and re-evaluate.

## 2018-03-02 NOTE — ED ADULT NURSE REASSESSMENT NOTE - NS ED NURSE REASSESS COMMENT FT1
Dr. Wise told pt she had to drink to hydrate to lower HR to go to . Pt started spilling water on the floor.  told pt she can be d/c if she doesn't want to help herself, pt asked for the police and to leave. [facility] security @ bedside for pt d/c. Belongings returned and 1:1 dismissed.

## 2018-03-02 NOTE — ED ADULT NURSE REASSESSMENT NOTE - NS ED NURSE REASSESS COMMENT FT1
Pt continues to be tachy  , pt asleep in no acute distress at this time, Dr. Hdez was made aware.  No current interventions at this present time.  Will continue to monitor and maintain safety.

## 2018-03-02 NOTE — ED BEHAVIORAL HEALTH ASSESSMENT NOTE - OTHER
emergency housing interpersonal conflict with peers not observed flat generall goal directed, occasionally tangential, occasional thought blocking - homeless, was placed in [facility] by [facility] after recent discahrge.
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
## 2018-03-02 NOTE — ED ADULT NURSE NOTE - OBJECTIVE STATEMENT
Assumed pt care @ 0040. Pt received sitting on stretcher in NAD, in yellow gown with belongings off the stretcher. Pt AOx3 C/O being homeless and wanting to kill myself. "I am tired of no one helping me". I smoked crack, weed, beer, and cigarettes today. Pt is experiencing SI but no HI, no plan. Lungs CTA, RR even unlabored. Denies Hallucinations, Chest Pain, SOB, Nausea, Vomiting, Diarrhea. Skin warm, dry, color appropriate for age and race. Safety maintained bed locked in the lowest position with 1:1 in place for pt safety/elopement.

## 2018-03-02 NOTE — ED ADULT NURSE REASSESSMENT NOTE - NS ED NURSE REASSESS COMMENT FT1
Pt currently resting offers no complaints at this present time , in acute distress at this time.  Will continue to monitor and maintain safety.

## 2018-03-02 NOTE — ED BEHAVIORAL HEALTH ASSESSMENT NOTE - ATTENTION / CONCENTRATION
OT ACUTE Initial Evaluation                                                                                                                                                BASELINE STATUS COMPARED WITH CURRENT STATUS: Presents with ADL/IADL status below baseline , which was modified independent . Pt lives with his friend in a 2 story home with 2 steps to enter. He was using a SBQC for mobility prior to admission.     ASSESSMENT:  Patient was admitted for a L TKA and is being evaluated POD #1 by OT with focus on functional transfers/mobility to bathroom and ADLs. Current overall functional status is minimal assist due to L LE pain, cues for sequencing/safety with use of gina WW.   Patient displays  good rehab potential as evidenced by participation. Patient will benefit from further skilled OT for continued training with ADLs and functional transfers to help the patient meet goals as listed in functional data section below. Recommending discharge to home.     Objective Measures Impacting Discharge Recommendation:   No formal objective measures completed this session    RECOMMENDATIONS FOR DISCHARGE:  Recommendations for Discharge: OT: Home (10/05/17 0900)  OT Identified Barriers to Discharge: pain   Equipment:  PT/OT Mobility Equipment for Discharge: electronic order for bariatric ww sent to MD, submit to dispense bin as appropriate. Medicare form signed and in blue folder, note sent for co-sign (10/05/17 0900)       PLAN: Continue skilled OT  Treatment Plan for Next Session: DEPT- full body dressing with AE education, tub transfer, car tx vs. simulated SUV transfer, item retrieval in kitchen        Frequency Comments: M-F ORTHO (H) DEPT-bring clothing, LW s/p L TKA 10/5/17    AM-PAC Outcomes -Daily Activity Domain  How much help from another person does the patient currently need?*  Task Score  Norm   1. Putting on and taking off regular lower body clothing? 2 - A Lot   2. Bathing (including washing, rinsing,  drying)?   2 - A Lot   3. Toileting, which includes using toilet, bedpan, or urinal? 3 - A Little   4. Putting on and taking off regular upper body clothing? 4 - None   5. Taking care of personal grooming such as brushing teeth? 3 - A Little   6. Eating meals?  4 - None   Raw Score: 18/24   Converted Score:  22.86 (Raw score = 8)   G code conversion CK: 40- 59% impairment (Raw score: 13-18)   Converted score >39.4 predictive of discharge to home  *Score based on clinical judgment/expected performance, may not have been performed during this session  Scoring Guidelines  1) Patient may use assistive devices unless otherwise indicated in question  2) Do not consider help for management of medical devices only (IV poles, catheters, NG, etc) as part of assist level  3) If patient requires device that substitutes for toileting or eating function (catheter, NG tube, PEG) they do not engage in these activities and are scored as Total  4) If activity was not observed and patient unable to do the activity, select \"Total\" .  If the patient can do the activity but was not directly observed, use profession judgment to determine how much assistance needed.    Task Modification: clinical decision making of low complexity, no task modification    Diagnosis:  1. Gait instability    2. Primary osteoarthritis of left knee    3. Leg swelling        Co-morbidities:   Patient Active Problem List   Diagnosis   • Thrombocytopenia, unspecified   • HTN (hypertension)   • Hyperlipidemia   • DJD (degenerative joint disease) of knee   • Obesity, unspecified   • Spinal stenosis, lumbar region, without neurogenic claudication   • Left carotid artery stenosis   • Chronic narcotic use       Precautions:  Precautions  Knee Precautions: Other (comment) (No longer needs knee immobilizer, nerve block worn off) (10/05/17 0013)  Weight Bearing Status: Weight bearing as tolerated left lower extremity (10/04/17 1068)  Other Precautions: s/p L TKA 10/04/17  (10/04/17 1538)    Prior Living Situation:  Type of Home: House (10/04/17 1535)  Lives With: Friend(s) (10/04/17 1535)    EDUCATION:   On this date, the patient was educated on role of acute OT, ADLs and functional transfers.    The response to education was: Needs reinforcement                                                    SUBJECTIVE: Patient's Personal Goal: to return home (10/05/17 0900)           OBJECTIVE:Basic Lines: IV (10/05/17 0900)  Safety Measures: Alarms (10/05/17 0900)    RN reported Grady Fall Scale Score: 60       Last 24 hours of Functional Data     ADLs  Self Cares/ADL's  Eating Assistance: Independent (10/05/17 0900)  Upper Body Dressing Assistance: Modified independent (10/05/17 0900)  Lower Body Clothing Assistance: Maximal Assist (Max) (10/05/17 0900)  Lower Body Dressing Deficit: Don/doff L sock (10/05/17 0900)  Toileting Assistance: Minimal Assist (Min) (10/05/17 0900)  Self Cares/ADL's Comments #1: requiring increased assist with LB dressing due to pain/stiffness (10/05/17 0900)    Household mobility  Household Mobility  Bed to Chair: Minimal Assist (Min) (10/05/17 0900)  Sit to Stand: Minimal Assist (Min) (10/05/17 0900)  Stand to Sit: Minimal Assist (Min) (10/05/17 0900)  Toilet Transfers: Minimal Assist (Min) (10/05/17 0900)  Transfer Equipment: gina WW and gait belt (10/05/17 0900)  Sitting - Static: Modified Independent (10/05/17 0900)  Sitting - Dynamic: Supervision (10/05/17 0900)  Standing - Static: Touching/Steadying Assistance (10/05/17 0900)  Standing - Dynamic: Minimal Assist (Min) (10/05/17 0900)  Household Mobility Comments #1: CGA to min assist with mobility using gina WW, cues needed for sequencing/safety with WW (10/05/17 0900)    Cognition  Communication/Cognition  Communication: Clear speech (10/05/17 0900)  Overall Cognitive Status: Within Functional Limits (10/05/17 0900)    Patient's Personal Goal: to return home (10/05/17 0900)    Therapy Goals  Goals  Short Term  Goals to Be Reviewed On: 10/12/17 (10/05/17 0900)  Short Term Goals Are The Same as Discharge Goals: Yes (10/05/17 0900)  Goal Agreement: Patient agrees with goals and treatment plan (10/05/17 0900)  Dressing Discharge Goal 1: mod I, full body  (10/05/17 0900)  Toileting Discharge Goal 1: mod I (10/05/17 0900)  Home Setting Transfer Discharge Goal 1: mod I with tub transfer (10/05/17 0900)  Home Setting Transfer Discharge Goal 2: mod I with car transfer (10/05/17 0900)  Home Management Discharge Goal 1: mod I with light item retrieval (10/05/17 0900)    Interventions and Treatment Time  Treatment Interventions: ADL retraining;Functional transfer training;Equipment eval/education;Patient/Family training;Compensatory technique education (10/05/17 0900)  OT Time Spent: 45 minutes (10/05/17 0900)      See OT flowsheet for full details regarding the OT therapy provided.        Normal

## 2018-03-02 NOTE — ED BEHAVIORAL HEALTH NOTE - BEHAVIORAL HEALTH NOTE
SW Note: Met with pt to discuss discharge plans. Pt pleasant but poor historian.  Pt stated that she is homeless and has no family/friends to contact for shelter or support. Recently discharged from [facility] ( [facility]) approx. 2 days ago. She was discharged to [facility] housing and provided a referral for  counseling. She stated she left DSS housing after 1 night, no reason given, and stayed at a train station. She lost her discharge papers so she cant recall what clinic she was referred too. Called [facility] and spoke with  Ina GIORDANO [phone]. She did not have access to the pts discharge records but recalled the referral was made to [facility] and that she has CM ( Daniella Guzmán) thru them. Called [facility] but office is closed early ( most likely due to the [facility]). Made a new intake appt at [facility] for 3/7 @ 10:30 since I could not confirm her follow up. I also left msg for Hanane Yadav ( [facility]) [phone] regarding above  Pt will need to utilize DSS emergency housing when medically cleared.   Requested that Case Management dept make a Health home referral. Also could not confirm if pt is connected with a health home. Pt agreeable to any community help/support. Consent form signed for [facility].

## 2018-03-02 NOTE — ED PROVIDER NOTE - NS ED ROS FT
no weight change, no fever or chills  no recent travel, no recent abox, no sick contacts  no recent change in medications  no rash, no bruises  no visual changes no eye discharge  no cough cold or congestion,   no sob, no chest pain  no orthopnea, no pnd  no abd pain, no n/v/d  no hematuria, no change in urinary habits  no joint pain, no deformity  no headache, no paresthesia no weight change, no fever or chills  no recent travel, no recent abox, no sick contacts  non-compliant with medications   no rash, no bruises  no visual changes no eye discharge  no cough cold or congestion,   no sob, no chest pain  no orthopnea, no pnd  no abd pain, no n/v/d  no hematuria, no change in urinary habits  no joint pain, no deformity  no headache, no paresthesia  positive for SI, denies HI or auditory/visual hallucinations

## 2018-03-02 NOTE — ED BEHAVIORAL HEALTH ASSESSMENT NOTE - HPI (INCLUDE ILLNESS QUALITY, SEVERITY, DURATION, TIMING, CONTEXT, MODIFYING FACTORS, ASSOCIATED SIGNS AND SYMPTOMS)
Pt. is a 42 year-old female, with chronic psychiatric history, with history of chronic schizoaffective disorder, bipolar type, multiple prior in-patient hospitalization and ED visits, with 15 year [place] inpatient stay discharge in 2017,  multiple prior suicide attempts and psychiatric hospitalization    Patient initially reported that she was discharged from [facility] 2 days ago , however [place] and [facility] were called and do not have any record of her recently being there. She initially reported bingeing on cannabis and cocaine upon admission and reported suicidal ideation and noncompliance with her medications. Patient reports she was recently in the psychiatric hospital because she needed a place to stay, and states the help she wants from the hospital today is getting a place to stay. She denies depression , suicdial ideation, paranoia, AH, VH, homicidal ideation.   Attempted to obtain collateral from an aunt Mireya at phone number provided by patient ; number is nonfunctional. Pt. is a 42 year-old female, with chronic psychiatric history, with history of chronic schizoaffective disorder, bipolar type, multiple prior in-patient hospitalization and ED visits, with 15 year [place] inpatient stay discharge in 2017,  multiple prior suicide attempts and psychiatric hospitalization    Patient initially reported that she was discharged from [facility] 2 days ago , however [place] and [facility] were called and do not have any record of her recently being there. She initially reported bingeing on cannabis and cocaine upon admission and reported suicidal ideation and noncompliance with her medications. Patient reports she was recently in the psychiatric hospital because she needed a place to stay, and states the help she wants from the hospital today is getting a place to stay. She denies depression , suicidal ideation, paranoia, AH, VH, homicidal ideation.  She reports she lost the paper indicating where she needs to follow up for outpatient psychiatry and does not know where her medications were sent. She reports she spent her entire SSI check which is in $800 range given on first of month  on cocaine, cananbis and cigarettes and states "I'm broke". She reports drug of choice is cocaine and that she does it every day, wants to stop however declines referral to outpatient substance treatment or to rehab.  She reports she was given haldol decanoate 100 mg IM just prior to leaving [facility].   Attempted to obtain collateral from an aunt Mireya at phone number provided by patient ; number is nonfunctional. Pt. is a 42 year-old female, with chronic psychiatric history, with history of chronic schizoaffective disorder, bipolar type, multiple prior in-patient hospitalization and ED visits, with 15 year [place] inpatient stay discharge in 2017,  multiple prior suicide attempts and psychiatric hospitalization    Patient initially reported that she was discharged from [facility] 2 days ago which was confirmed by SW. COllateral from SW indicated patient does have outpatient follow up as well as a  and was referred for housing.  Patient initially reported bingeing on cannabis and cocaine upon presentation to ED and reported suicidal ideation and noncompliance with her medications. Patient reports she was recently in the psychiatric hospital because she needed a place to stay, and states the help she wants from the hospital today is getting a place to stay. She denies depression , suicidal ideation, paranoia, AH, VH, homicidal ideation.  She reports she lost the paper indicating where she needs to follow up for outpatient psychiatry and does not know where her medications were sent. She reports she spent her entire SSI check which is in $800 range given on first of month  on cocaine, cananbis and cigarettes and states "I'm broke". She reports drug of choice is cocaine and that she does it every day, wants to stop however declines referral to outpatient substance treatment or to rehab.  She reports she was given haldol decanoate 100 mg IM just prior to leaving [facility].   Attempted to obtain collateral from an aunt Mireya at phone number provided by patient ; number is nonfunctional.

## 2018-03-02 NOTE — ED ADULT NURSE NOTE - OBJECTIVE STATEMENT
Patient recently discharged from [facility]. Reportedly went to [facility] where she was discharged to, received monthly disability check and used it on illegal drugs. Came to hospital and was discharged before returning to hospital complaining of suicidal ideation. Patient states, "I need to be in the hospital so I don't kill myself and I can get back on my meds". Patient oriented to unit and assisted to room [facility]. Resting soundly a few minutes later. No distress evident.

## 2018-03-02 NOTE — ED BEHAVIORAL HEALTH ASSESSMENT NOTE - CURRENT MEDICATION
Depakote 750mg BID; Risperdal 2mg BID; Haldol 25mg BID; Cogentin 2mg BID patient reports being prescribed haldol decanoate 100 mg IM monthly, depakote 250 mg BID, cymbalta 40 mg daily.

## 2018-03-02 NOTE — ED PROVIDER NOTE - OBJECTIVE STATEMENT
41 y/o F pt with PMHx of 43 y/o F pt with PMHx of depression, schizophrenic behavior and suicidal behavior, non-compliant with medication regimen, presents to ED c/o suicidal thoughts 41 y/o F pt with PMHx of depression, schizophrenic behavior and suicidal behavior, non-compliant with medication regimen, presents to ED c/o suicidal thoughts. She states "she does not feel like living".  Pt was [facility] but states that she was discharged without her medications so she has not taken them in 2 days. She is currently taking Cymbalta, Depakote and Haldol.l 43 y/o F pt with PMHx of depression, schizophrenic behavior and suicidal behavior, non-compliant with medication regimen, presents to ED c/o suicidal thoughts. She states "she does not feel like living".  Pt was [facility] but states that she was discharged without her medications so she has not taken them in 2 days and has no place to live. She is currently taking Cymbalta, Depakote and Haldol. Denies HI or auditory/visual hallucinations. No further complaints at this time.

## 2018-03-02 NOTE — ED BEHAVIORAL HEALTH ASSESSMENT NOTE - PRIMARY DX
Schizoaffective disorder, unspecified type Deferred diagnosis on axis II Other schizoaffective disorders

## 2018-05-02 ENCOUNTER — EMERGENCY (EMERGENCY)
Facility: HOSPITAL | Age: 43
LOS: 1 days | Discharge: HOME | End: 2018-05-02
Attending: EMERGENCY MEDICINE
Payer: COMMERCIAL

## 2018-05-02 VITALS
TEMPERATURE: 99 F | DIASTOLIC BLOOD PRESSURE: 90 MMHG | HEART RATE: 93 BPM | HEIGHT: 69 IN | RESPIRATION RATE: 16 BRPM | OXYGEN SATURATION: 97 % | WEIGHT: 205.91 LBS | SYSTOLIC BLOOD PRESSURE: 120 MMHG

## 2018-05-02 LAB
ALBUMIN SERPL ELPH-MCNC: 3.9 G/DL — SIGNIFICANT CHANGE UP (ref 3.3–5.2)
ALP SERPL-CCNC: 94 U/L — SIGNIFICANT CHANGE UP (ref 40–120)
ALT FLD-CCNC: 8 U/L — SIGNIFICANT CHANGE UP
AMPHET UR-MCNC: NEGATIVE — SIGNIFICANT CHANGE UP
ANION GAP SERPL CALC-SCNC: 14 MMOL/L — SIGNIFICANT CHANGE UP (ref 5–17)
APAP SERPL-MCNC: <7.5 UG/ML — LOW (ref 10–26)
APPEARANCE UR: CLEAR — SIGNIFICANT CHANGE UP
AST SERPL-CCNC: 12 U/L — SIGNIFICANT CHANGE UP
BACTERIA # UR AUTO: ABNORMAL
BARBITURATES UR SCN-MCNC: NEGATIVE — SIGNIFICANT CHANGE UP
BASOPHILS # BLD AUTO: 0 K/UL — SIGNIFICANT CHANGE UP (ref 0–0.2)
BASOPHILS NFR BLD AUTO: 0.7 % — SIGNIFICANT CHANGE UP (ref 0–2)
BENZODIAZ UR-MCNC: NEGATIVE — SIGNIFICANT CHANGE UP
BILIRUB SERPL-MCNC: 0.3 MG/DL — LOW (ref 0.4–2)
BILIRUB UR-MCNC: NEGATIVE — SIGNIFICANT CHANGE UP
BUN SERPL-MCNC: 9 MG/DL — SIGNIFICANT CHANGE UP (ref 8–20)
CALCIUM SERPL-MCNC: 9.6 MG/DL — SIGNIFICANT CHANGE UP (ref 8.6–10.2)
CHLORIDE SERPL-SCNC: 100 MMOL/L — SIGNIFICANT CHANGE UP (ref 98–107)
CO2 SERPL-SCNC: 23 MMOL/L — SIGNIFICANT CHANGE UP (ref 22–29)
COCAINE METAB.OTHER UR-MCNC: POSITIVE
COLOR SPEC: YELLOW — SIGNIFICANT CHANGE UP
CREAT SERPL-MCNC: 0.61 MG/DL — SIGNIFICANT CHANGE UP (ref 0.5–1.3)
DIFF PNL FLD: NEGATIVE — SIGNIFICANT CHANGE UP
EOSINOPHIL # BLD AUTO: 0.1 K/UL — SIGNIFICANT CHANGE UP (ref 0–0.5)
EOSINOPHIL NFR BLD AUTO: 2.1 % — SIGNIFICANT CHANGE UP (ref 0–6)
EPI CELLS # UR: SIGNIFICANT CHANGE UP
ETHANOL SERPL-MCNC: <10 MG/DL — SIGNIFICANT CHANGE UP
GLUCOSE SERPL-MCNC: 81 MG/DL — SIGNIFICANT CHANGE UP (ref 70–115)
GLUCOSE UR QL: NEGATIVE MG/DL — SIGNIFICANT CHANGE UP
HCG UR QL: NEGATIVE — SIGNIFICANT CHANGE UP
HCT VFR BLD CALC: 38 % — SIGNIFICANT CHANGE UP (ref 37–47)
HGB BLD-MCNC: 11.8 G/DL — LOW (ref 12–16)
KETONES UR-MCNC: ABNORMAL
LEUKOCYTE ESTERASE UR-ACNC: ABNORMAL
LYMPHOCYTES # BLD AUTO: 1.7 K/UL — SIGNIFICANT CHANGE UP (ref 1–4.8)
LYMPHOCYTES # BLD AUTO: 28.5 % — SIGNIFICANT CHANGE UP (ref 20–55)
MCHC RBC-ENTMCNC: 26.2 PG — LOW (ref 27–31)
MCHC RBC-ENTMCNC: 31.1 G/DL — LOW (ref 32–36)
MCV RBC AUTO: 84.3 FL — SIGNIFICANT CHANGE UP (ref 81–99)
METHADONE UR-MCNC: NEGATIVE — SIGNIFICANT CHANGE UP
MONOCYTES # BLD AUTO: 0.8 K/UL — SIGNIFICANT CHANGE UP (ref 0–0.8)
MONOCYTES NFR BLD AUTO: 13.7 % — HIGH (ref 3–10)
NEUTROPHILS # BLD AUTO: 3.4 K/UL — SIGNIFICANT CHANGE UP (ref 1.8–8)
NEUTROPHILS NFR BLD AUTO: 54.8 % — SIGNIFICANT CHANGE UP (ref 37–73)
NITRITE UR-MCNC: NEGATIVE — SIGNIFICANT CHANGE UP
OPIATES UR-MCNC: NEGATIVE — SIGNIFICANT CHANGE UP
PCP SPEC-MCNC: SIGNIFICANT CHANGE UP
PCP UR-MCNC: NEGATIVE — SIGNIFICANT CHANGE UP
PH UR: 6.5 — SIGNIFICANT CHANGE UP (ref 5–8)
PLATELET # BLD AUTO: 319 K/UL — SIGNIFICANT CHANGE UP (ref 150–400)
POTASSIUM SERPL-MCNC: 4.4 MMOL/L — SIGNIFICANT CHANGE UP (ref 3.5–5.3)
POTASSIUM SERPL-SCNC: 4.4 MMOL/L — SIGNIFICANT CHANGE UP (ref 3.5–5.3)
PROT SERPL-MCNC: 7.6 G/DL — SIGNIFICANT CHANGE UP (ref 6.6–8.7)
PROT UR-MCNC: NEGATIVE MG/DL — SIGNIFICANT CHANGE UP
RBC # BLD: 4.51 M/UL — SIGNIFICANT CHANGE UP (ref 4.4–5.2)
RBC # FLD: 18.3 % — HIGH (ref 11–15.6)
RBC CASTS # UR COMP ASSIST: SIGNIFICANT CHANGE UP /HPF (ref 0–4)
SALICYLATES SERPL-MCNC: <0.6 MG/DL — LOW (ref 10–20)
SODIUM SERPL-SCNC: 137 MMOL/L — SIGNIFICANT CHANGE UP (ref 135–145)
SP GR SPEC: 1.01 — SIGNIFICANT CHANGE UP (ref 1.01–1.02)
THC UR QL: NEGATIVE — SIGNIFICANT CHANGE UP
TSH SERPL-MCNC: 0.76 UIU/ML — SIGNIFICANT CHANGE UP (ref 0.27–4.2)
UROBILINOGEN FLD QL: 1 MG/DL
WBC # BLD: 6.1 K/UL — SIGNIFICANT CHANGE UP (ref 4.8–10.8)
WBC # FLD AUTO: 6.1 K/UL — SIGNIFICANT CHANGE UP (ref 4.8–10.8)
WBC UR QL: SIGNIFICANT CHANGE UP

## 2018-05-02 PROCEDURE — 90792 PSYCH DIAG EVAL W/MED SRVCS: CPT

## 2018-05-02 PROCEDURE — 99213 OFFICE O/P EST LOW 20 MIN: CPT

## 2018-05-02 PROCEDURE — 99284 EMERGENCY DEPT VISIT MOD MDM: CPT

## 2018-05-02 RX ORDER — BENZTROPINE MESYLATE 1 MG
1 TABLET ORAL
Qty: 0 | Refills: 0 | Status: DISCONTINUED | OUTPATIENT
Start: 2018-05-02 | End: 2018-05-07

## 2018-05-02 RX ORDER — DIVALPROEX SODIUM 500 MG/1
500 TABLET, DELAYED RELEASE ORAL ONCE
Qty: 0 | Refills: 0 | Status: COMPLETED | OUTPATIENT
Start: 2018-05-02 | End: 2018-05-02

## 2018-05-02 RX ORDER — RISPERIDONE 4 MG/1
2 TABLET ORAL
Qty: 0 | Refills: 0 | Status: DISCONTINUED | OUTPATIENT
Start: 2018-05-02 | End: 2018-05-07

## 2018-05-02 RX ORDER — RISPERIDONE 4 MG/1
1 TABLET ORAL
Qty: 0 | Refills: 0 | COMMUNITY

## 2018-05-02 RX ORDER — BENZTROPINE MESYLATE 1 MG
1 TABLET ORAL
Qty: 60 | Refills: 0 | OUTPATIENT
Start: 2018-05-02 | End: 2018-05-31

## 2018-05-02 RX ORDER — RISPERIDONE 4 MG/1
1 TABLET ORAL
Qty: 60 | Refills: 0 | OUTPATIENT
Start: 2018-05-02 | End: 2018-05-31

## 2018-05-02 RX ORDER — DIVALPROEX SODIUM 500 MG/1
1 TABLET, DELAYED RELEASE ORAL
Qty: 60 | Refills: 0 | OUTPATIENT
Start: 2018-05-02 | End: 2018-05-31

## 2018-05-02 RX ADMIN — DIVALPROEX SODIUM 500 MILLIGRAM(S): 500 TABLET, DELAYED RELEASE ORAL at 22:05

## 2018-05-02 RX ADMIN — RISPERIDONE 2 MILLIGRAM(S): 4 TABLET ORAL at 22:05

## 2018-05-02 RX ADMIN — Medication 1 MILLIGRAM(S): at 22:05

## 2018-05-02 NOTE — ED BEHAVIORAL HEALTH ASSESSMENT NOTE - DESCRIPTION (FIRST USE, LAST USE, QUANTITY, FREQUENCY, DURATION)
Whenever she can buy cigarettes denies uses cannabis when she can afford per record denies today however utox positive, per record reports daily cocaine use when she can afford it, unable to quantify quantity, states she spends all of her money on it

## 2018-05-02 NOTE — ED ADULT NURSE NOTE - CHPI ED SYMPTOMS NEG
no agitation/no confusion/no homicidal/no disorientation/no change in level of consciousness/no hallucinations/no paranoia

## 2018-05-02 NOTE — ED BEHAVIORAL HEALTH ASSESSMENT NOTE - RISK ASSESSMENT
Chronic risk due to hx of schizoaffective disorder, multiple suicide attempts and hx of substance abuse. Acute risk due to cocaine use. However patient denies suicidal and homicidal ideation, is not acutely depressed or psychotic , displays help seeking behavior, denies having guns. Patient assessed to not be at imminent risk of harm to self or others.

## 2018-05-02 NOTE — ED BEHAVIORAL HEALTH ASSESSMENT NOTE - SUMMARY
42 year-old female, with chronic psychiatric history, with history of chronic schizoaffective disorder, bipolar type, multiple prior in-patient hospitalization and ED visits, with 15 year [place] inpatient stay discharge in 2017,  multiple prior suicide attempts and psychiatric hospitalization  Patient reports compliance with medications , requests for refill to be sent to pharmacy and is requesting assistance with getting housing.

## 2018-05-02 NOTE — ED PROVIDER NOTE - PROGRESS NOTE DETAILS
holding discharge, pending SW and DSS in morning for housing recd sign out  await , dc papers printed  patient remained stable, night was uneventful,

## 2018-05-02 NOTE — ED ADULT NURSE NOTE - CAS DISCH TRANSFER METHOD
Transportation service/Bus tickets given Bus tickets given- Shown bus stop in front of Hospital/Transportation service

## 2018-05-02 NOTE — ED BEHAVIORAL HEALTH ASSESSMENT NOTE - CURRENT MEDICATION
patient reports being prescribed haldol decanoate 100 mg IM monthly, depakote 250 mg BID, cymbalta 40 mg daily. risperidone 2 mg BID
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
## 2018-05-02 NOTE — ED BEHAVIORAL HEALTH ASSESSMENT NOTE - MEDICATIONS (PRESCRIPTIONS, DIRECTIONS)
continue as prescribed; 1 month supply of depakotedr  250 mg BID and Cymbalta 60 mg daily called into [facility] [id number] in [place]

## 2018-05-02 NOTE — ED ADULT TRIAGE NOTE - CHIEF COMPLAINT QUOTE
Pt BIBA requesting a psych eval. Pt states she is hungry and requesting to speak to a psychiatrist. Denies SI or HI. Dr. Allen at bedside, pt cleared for .
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
## 2018-05-02 NOTE — ED ADULT NURSE NOTE - CINV DISCH MEDS REVIEWED YN
Yes/Meds filled at hospital Pharmacy. Unable to fill Risperidone  as she just filled in own pharmacy Yes/Meds filled at hospital Pharmacy. Unable to fill Risperidone  as she just filled in own pharmacy. Pt made aware by nurse and pharmacist.
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
## 2018-05-02 NOTE — ED ADULT NURSE NOTE - CAS DISCH CONDITION
Stable Pt walked to [facility] to  RX[facility].  Walked to front entrance. Support and encouragement given. Pt grees to take bus directly to [facility], will follow with [facility] on Monday . Denies S/I and H/I, no psychosis observed. All belonging returned./Stable

## 2018-05-02 NOTE — ED BEHAVIORAL HEALTH ASSESSMENT NOTE - OTHER PAST PSYCHIATRIC HISTORY (INCLUDE DETAILS REGARDING ONSET, COURSE OF ILLNESS, INPATIENT/OUTPATIENT TREATMENT)
multiple hospitalizations  Also hospitalized at [facility] and [facility].  Reports long hx of mental illness- schizoaffective disorder

## 2018-05-02 NOTE — ED ADULT NURSE NOTE - SUBSTANCE USE COMMENT, PROFILE
used crack cocaine alast not after years of abstainace used crack cocaine last night for one day relapse after years being clean

## 2018-05-03 VITALS
RESPIRATION RATE: 17 BRPM | DIASTOLIC BLOOD PRESSURE: 85 MMHG | HEART RATE: 72 BPM | SYSTOLIC BLOOD PRESSURE: 125 MMHG | OXYGEN SATURATION: 97 % | TEMPERATURE: 98 F

## 2018-05-03 PROCEDURE — 36415 COLL VENOUS BLD VENIPUNCTURE: CPT

## 2018-05-03 PROCEDURE — 84443 ASSAY THYROID STIM HORMONE: CPT

## 2018-05-03 PROCEDURE — 81001 URINALYSIS AUTO W/SCOPE: CPT

## 2018-05-03 PROCEDURE — 80053 COMPREHEN METABOLIC PANEL: CPT

## 2018-05-03 PROCEDURE — 99284 EMERGENCY DEPT VISIT MOD MDM: CPT

## 2018-05-03 PROCEDURE — 99211 OFF/OP EST MAY X REQ PHY/QHP: CPT

## 2018-05-03 PROCEDURE — 85027 COMPLETE CBC AUTOMATED: CPT

## 2018-05-03 PROCEDURE — 81025 URINE PREGNANCY TEST: CPT

## 2018-05-03 PROCEDURE — 80307 DRUG TEST PRSMV CHEM ANLYZR: CPT

## 2018-05-03 RX ORDER — RISPERIDONE 4 MG/1
1 TABLET ORAL
Qty: 60 | Refills: 0 | OUTPATIENT
Start: 2018-05-03 | End: 2018-06-01

## 2018-05-03 RX ORDER — BENZTROPINE MESYLATE 1 MG
1 TABLET ORAL
Qty: 60 | Refills: 0 | OUTPATIENT
Start: 2018-05-03 | End: 2018-06-01

## 2018-05-03 RX ORDER — BENZTROPINE MESYLATE 1 MG
1 TABLET ORAL
Qty: 0 | Refills: 0 | COMMUNITY

## 2018-05-03 RX ORDER — DIVALPROEX SODIUM 500 MG/1
1 TABLET, DELAYED RELEASE ORAL
Qty: 60 | Refills: 0 | OUTPATIENT
Start: 2018-05-03 | End: 2018-06-01

## 2018-05-03 RX ADMIN — Medication 1 MILLIGRAM(S): at 07:20

## 2018-05-03 RX ADMIN — RISPERIDONE 2 MILLIGRAM(S): 4 TABLET ORAL at 07:20

## 2018-05-03 NOTE — ED ADULT NURSE REASSESSMENT NOTE - NS ED NURSE REASSESS COMMENT FT1
Pt currently resting in no acute distress at this present time.  Will continue and maintain safety.
Pt discharged but will stay overnight for DSS in the morning for housing.  Pt calm and cooperative in no acute distress at this present time.  Will continue to monitor and maintain safety.
Pt will be a treat and release waiting to speak to SW for housing.  Will continue to monitor and maintain safety.
Dr. Allen notified of plan of care.  Patient ate 100% of food offered.  No attempts to harm self or others and safety maintained.
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
## 2018-05-03 NOTE — ED ADULT NURSE REASSESSMENT NOTE - COMFORT CARE
meal provided/po fluids offered
showering/plan of care explained/treatment delay explained
meal provided/plan of care explained

## 2018-05-03 NOTE — ED ADULT NURSE REASSESSMENT NOTE - CONDITION
Pt ready and wanting to discharge. Asking to speak with Dr. Jones to find out what the delay is. Pt in showering at this time. Awaiting return of Dr. Jones for correction of Pharmacy PT uses. Denies S/I and H/I./unchanged

## 2018-05-03 NOTE — ED BEHAVIORAL HEALTH NOTE - BEHAVIORAL HEALTH NOTE
SW Note: pt was seen again by Dr. Jones because earlier this morning pt made a statement that she felt suicidal, no plan indicated. Shortly after making this statement pt was up and about on the unit. Socializing with another peer. Mood improved. Stating she would like to be discharged and aware she will need to go to [facility] for emergency housing. Pt did decline consent for SW to contact her o/p  provider stating she has an appointment and planned to call them after she gets housing thru [facility].   Dr. Jones sent pts scripts to [facility] so she can leave with her meds. Bus tickets provided with directions. No complaints from pt.

## 2018-07-07 ENCOUNTER — EMERGENCY (EMERGENCY)
Facility: HOSPITAL | Age: 43
LOS: 1 days | Discharge: HOME | End: 2018-07-07
Attending: EMERGENCY MEDICINE
Payer: COMMERCIAL

## 2018-07-07 VITALS
SYSTOLIC BLOOD PRESSURE: 117 MMHG | DIASTOLIC BLOOD PRESSURE: 79 MMHG | TEMPERATURE: 98 F | WEIGHT: 205.91 LBS | HEIGHT: 69 IN | OXYGEN SATURATION: 96 % | HEART RATE: 86 BPM | RESPIRATION RATE: 18 BRPM

## 2018-07-07 PROCEDURE — 99284 EMERGENCY DEPT VISIT MOD MDM: CPT | Mod: 25

## 2018-07-07 PROCEDURE — 90792 PSYCH DIAG EVAL W/MED SRVCS: CPT
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
## 2018-07-08 VITALS
DIASTOLIC BLOOD PRESSURE: 60 MMHG | RESPIRATION RATE: 18 BRPM | HEART RATE: 100 BPM | SYSTOLIC BLOOD PRESSURE: 96 MMHG | OXYGEN SATURATION: 100 %

## 2018-07-08 DIAGNOSIS — R69 ILLNESS, UNSPECIFIED: ICD-10-CM

## 2018-07-08 DIAGNOSIS — F25.0 SCHIZOAFFECTIVE DISORDER, BIPOLAR TYPE: ICD-10-CM

## 2018-07-08 LAB
ALBUMIN SERPL ELPH-MCNC: 3.9 G/DL — SIGNIFICANT CHANGE UP (ref 3.3–5.2)
ALP SERPL-CCNC: 85 U/L — SIGNIFICANT CHANGE UP (ref 40–120)
ALT FLD-CCNC: 18 U/L — SIGNIFICANT CHANGE UP
AMPHET UR-MCNC: NEGATIVE — SIGNIFICANT CHANGE UP
ANION GAP SERPL CALC-SCNC: 13 MMOL/L — SIGNIFICANT CHANGE UP (ref 5–17)
APAP SERPL-MCNC: <7.5 UG/ML — LOW (ref 10–26)
APPEARANCE UR: CLEAR — SIGNIFICANT CHANGE UP
AST SERPL-CCNC: 20 U/L — SIGNIFICANT CHANGE UP
BACTERIA # UR AUTO: ABNORMAL
BARBITURATES UR SCN-MCNC: NEGATIVE — SIGNIFICANT CHANGE UP
BASOPHILS # BLD AUTO: 0 K/UL — SIGNIFICANT CHANGE UP (ref 0–0.2)
BASOPHILS NFR BLD AUTO: 0.7 % — SIGNIFICANT CHANGE UP (ref 0–2)
BENZODIAZ UR-MCNC: NEGATIVE — SIGNIFICANT CHANGE UP
BILIRUB SERPL-MCNC: 0.3 MG/DL — LOW (ref 0.4–2)
BILIRUB UR-MCNC: NEGATIVE — SIGNIFICANT CHANGE UP
BUN SERPL-MCNC: 16 MG/DL — SIGNIFICANT CHANGE UP (ref 8–20)
CALCIUM SERPL-MCNC: 9.4 MG/DL — SIGNIFICANT CHANGE UP (ref 8.6–10.2)
CHLORIDE SERPL-SCNC: 100 MMOL/L — SIGNIFICANT CHANGE UP (ref 98–107)
CO2 SERPL-SCNC: 24 MMOL/L — SIGNIFICANT CHANGE UP (ref 22–29)
COCAINE METAB.OTHER UR-MCNC: POSITIVE
COLOR SPEC: YELLOW — SIGNIFICANT CHANGE UP
CREAT SERPL-MCNC: 0.58 MG/DL — SIGNIFICANT CHANGE UP (ref 0.5–1.3)
DIFF PNL FLD: ABNORMAL
EOSINOPHIL # BLD AUTO: 0.1 K/UL — SIGNIFICANT CHANGE UP (ref 0–0.5)
EOSINOPHIL NFR BLD AUTO: 1.6 % — SIGNIFICANT CHANGE UP (ref 0–6)
EPI CELLS # UR: ABNORMAL
ETHANOL SERPL-MCNC: <10 MG/DL — SIGNIFICANT CHANGE UP
GLUCOSE SERPL-MCNC: 88 MG/DL — SIGNIFICANT CHANGE UP (ref 70–115)
GLUCOSE UR QL: NEGATIVE MG/DL — SIGNIFICANT CHANGE UP
HCG SERPL-ACNC: <5 MIU/ML — SIGNIFICANT CHANGE UP
HCT VFR BLD CALC: 37 % — SIGNIFICANT CHANGE UP (ref 37–47)
HGB BLD-MCNC: 11.6 G/DL — LOW (ref 12–16)
KETONES UR-MCNC: NEGATIVE — SIGNIFICANT CHANGE UP
LEUKOCYTE ESTERASE UR-ACNC: ABNORMAL
LYMPHOCYTES # BLD AUTO: 2.3 K/UL — SIGNIFICANT CHANGE UP (ref 1–4.8)
LYMPHOCYTES # BLD AUTO: 34.4 % — SIGNIFICANT CHANGE UP (ref 20–55)
MCHC RBC-ENTMCNC: 27.3 PG — SIGNIFICANT CHANGE UP (ref 27–31)
MCHC RBC-ENTMCNC: 31.4 G/DL — LOW (ref 32–36)
MCV RBC AUTO: 87.1 FL — SIGNIFICANT CHANGE UP (ref 81–99)
METHADONE UR-MCNC: NEGATIVE — SIGNIFICANT CHANGE UP
MONOCYTES # BLD AUTO: 0.9 K/UL — HIGH (ref 0–0.8)
MONOCYTES NFR BLD AUTO: 13.1 % — HIGH (ref 3–10)
NEUTROPHILS # BLD AUTO: 3.4 K/UL — SIGNIFICANT CHANGE UP (ref 1.8–8)
NEUTROPHILS NFR BLD AUTO: 50.1 % — SIGNIFICANT CHANGE UP (ref 37–73)
NITRITE UR-MCNC: NEGATIVE — SIGNIFICANT CHANGE UP
OPIATES UR-MCNC: NEGATIVE — SIGNIFICANT CHANGE UP
PCP SPEC-MCNC: SIGNIFICANT CHANGE UP
PCP SPEC-MCNC: SIGNIFICANT CHANGE UP
PCP UR-MCNC: NEGATIVE — SIGNIFICANT CHANGE UP
PH UR: 6 — SIGNIFICANT CHANGE UP (ref 5–8)
PLATELET # BLD AUTO: 228 K/UL — SIGNIFICANT CHANGE UP (ref 150–400)
POTASSIUM SERPL-MCNC: 3.9 MMOL/L — SIGNIFICANT CHANGE UP (ref 3.5–5.3)
POTASSIUM SERPL-SCNC: 3.9 MMOL/L — SIGNIFICANT CHANGE UP (ref 3.5–5.3)
PROT SERPL-MCNC: 7.6 G/DL — SIGNIFICANT CHANGE UP (ref 6.6–8.7)
PROT UR-MCNC: 30 MG/DL
RBC # BLD: 4.25 M/UL — LOW (ref 4.4–5.2)
RBC # FLD: 17.8 % — HIGH (ref 11–15.6)
RBC CASTS # UR COMP ASSIST: SIGNIFICANT CHANGE UP /HPF (ref 0–4)
SALICYLATES SERPL-MCNC: <0.6 MG/DL — LOW (ref 10–20)
SODIUM SERPL-SCNC: 137 MMOL/L — SIGNIFICANT CHANGE UP (ref 135–145)
SP GR SPEC: 1.02 — SIGNIFICANT CHANGE UP (ref 1.01–1.02)
THC UR QL: POSITIVE
UROBILINOGEN FLD QL: 1 MG/DL
VALPROATE SERPL-MCNC: <3.7 UG/ML — LOW (ref 50–100)
WBC # BLD: 6.8 K/UL — SIGNIFICANT CHANGE UP (ref 4.8–10.8)
WBC # FLD AUTO: 6.8 K/UL — SIGNIFICANT CHANGE UP (ref 4.8–10.8)
WBC UR QL: SIGNIFICANT CHANGE UP

## 2018-07-08 PROCEDURE — 80053 COMPREHEN METABOLIC PANEL: CPT

## 2018-07-08 PROCEDURE — 85027 COMPLETE CBC AUTOMATED: CPT

## 2018-07-08 PROCEDURE — 93005 ELECTROCARDIOGRAM TRACING: CPT

## 2018-07-08 PROCEDURE — 84702 CHORIONIC GONADOTROPIN TEST: CPT

## 2018-07-08 PROCEDURE — 81001 URINALYSIS AUTO W/SCOPE: CPT

## 2018-07-08 PROCEDURE — 80307 DRUG TEST PRSMV CHEM ANLYZR: CPT

## 2018-07-08 PROCEDURE — 80164 ASSAY DIPROPYLACETIC ACD TOT: CPT

## 2018-07-08 PROCEDURE — 99284 EMERGENCY DEPT VISIT MOD MDM: CPT

## 2018-07-08 PROCEDURE — 93010 ELECTROCARDIOGRAM REPORT: CPT

## 2018-07-08 RX ORDER — BENZTROPINE MESYLATE 1 MG
1 TABLET ORAL ONCE
Qty: 0 | Refills: 0 | Status: COMPLETED | OUTPATIENT
Start: 2018-07-08 | End: 2018-07-08

## 2018-07-08 RX ORDER — RISPERIDONE 4 MG/1
2 TABLET ORAL ONCE
Qty: 0 | Refills: 0 | Status: COMPLETED | OUTPATIENT
Start: 2018-07-08 | End: 2018-07-08

## 2018-07-08 RX ORDER — DIVALPROEX SODIUM 500 MG/1
1000 TABLET, DELAYED RELEASE ORAL ONCE
Qty: 0 | Refills: 0 | Status: COMPLETED | OUTPATIENT
Start: 2018-07-08 | End: 2018-07-08

## 2018-07-08 RX ADMIN — RISPERIDONE 2 MILLIGRAM(S): 4 TABLET ORAL at 10:30

## 2018-07-08 RX ADMIN — DIVALPROEX SODIUM 1000 MILLIGRAM(S): 500 TABLET, DELAYED RELEASE ORAL at 10:30

## 2018-07-08 RX ADMIN — Medication 1 MILLIGRAM(S): at 10:30

## 2018-07-08 NOTE — ED ADULT NURSE NOTE - CHPI ED SYMPTOMS NEG
no weight loss/no homicidal/no weakness/no confusion/no agitation/no disorientation/no paranoia/no change in level of consciousness

## 2018-07-08 NOTE — ED ADULT NURSE REASSESSMENT NOTE - NS ED NURSE REASSESS COMMENT FT1
Pt currently eating in no acute distress at this present time.  Will continue to monitor and maintain safety.
Pt currently resting in no acute distress at this present time, pending UA collection, fluids encouraged.  Pt currently resting.  Will continue to monitor and  maintain safety.
Patient denies suicidal or homicidal ideation.  Patient endorses she is safe to be discharged.  No attempts to harm self or others and safety maintained.
Assumed care of patient at 0730.  Patient awake resting in bed.  Patient pleasant on interaction.  Patient educated about plan of care and need for urine sample.  Safety of patient maintained.

## 2018-07-08 NOTE — ED ADULT NURSE REASSESSMENT NOTE - GENERAL PATIENT STATE
comfortable appearance/cooperative/resting/sleeping
cooperative/resting/sleeping/comfortable appearance
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
## 2018-07-08 NOTE — CHART NOTE - NSCHARTNOTEFT_GEN_A_CORE
SW Note: pt reports being homeless - Called DSS spoke with Javi pt can be placed at [place] [id number].  pt requires medicaid transportation. pt's transportation is carved out to medicaid. Called [employer] and [id number]   [employer]  in  minutes. pt informed and told to wait in ER Lobby. pt agreeable.

## 2018-07-08 NOTE — ED BEHAVIORAL HEALTH ASSESSMENT NOTE - AXIS IV
Housing problems/Economic problems/Problems with primary support/Problem related to social environment
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
## 2018-07-22 ENCOUNTER — EMERGENCY (EMERGENCY)
Facility: HOSPITAL | Age: 43
LOS: 1 days | Discharge: HOME | End: 2018-07-22
Attending: EMERGENCY MEDICINE
Payer: COMMERCIAL

## 2018-07-22 VITALS
HEIGHT: 69 IN | TEMPERATURE: 98 F | WEIGHT: 149.91 LBS | DIASTOLIC BLOOD PRESSURE: 58 MMHG | OXYGEN SATURATION: 98 % | SYSTOLIC BLOOD PRESSURE: 138 MMHG | HEART RATE: 120 BPM | RESPIRATION RATE: 18 BRPM

## 2018-07-22 LAB — HIV 1 & 2 AB SERPL IA.RAPID: SIGNIFICANT CHANGE UP

## 2018-07-22 PROCEDURE — 36415 COLL VENOUS BLD VENIPUNCTURE: CPT

## 2018-07-22 PROCEDURE — 99283 EMERGENCY DEPT VISIT LOW MDM: CPT

## 2018-07-22 PROCEDURE — 86703 HIV-1/HIV-2 1 RESULT ANTBDY: CPT

## 2018-07-22 PROCEDURE — 99284 EMERGENCY DEPT VISIT MOD MDM: CPT | Mod: 25

## 2018-07-22 RX ORDER — FLUCONAZOLE 150 MG/1
200 TABLET ORAL ONCE
Qty: 0 | Refills: 0 | Status: DISCONTINUED | OUTPATIENT
Start: 2018-07-22 | End: 2018-07-22

## 2018-07-22 RX ORDER — FLUCONAZOLE 150 MG/1
150 TABLET ORAL ONCE
Qty: 0 | Refills: 0 | Status: COMPLETED | OUTPATIENT
Start: 2018-07-22 | End: 2018-07-22

## 2018-07-22 RX ADMIN — FLUCONAZOLE 150 MILLIGRAM(S): 150 TABLET ORAL at 08:44

## 2018-07-22 NOTE — ED STATDOCS - ATTENDING CONTRIBUTION TO CARE
case d/w NP:  c/o vag itch for past few days, discharge without odor, no pain.  also requesting HIV test. PE  by NP reported c/w candida vaginitis.  HIV negative.  A/p candida vaginitis

## 2018-07-22 NOTE — ED STATDOCS - MEDICAL DECISION MAKING DETAILS
Will check HIV as per patient's request, diflucan 150 mg for yeast infection refer to GYN for PAP smear.

## 2018-07-22 NOTE — ED STATDOCS - OBJECTIVE STATEMENT
41 y/o F presents with c/o vaginal itch for the past several days with white discharge.  She is requesting HIV testing, refusing GC/Chlamydia testing.  Has not seen GYN in several years.  Denies fever, dysuria, hematuria or abdominal pain.

## 2018-07-22 NOTE — ED ADULT TRIAGE NOTE - CHIEF COMPLAINT QUOTE
patient biba from home states that she has vaginal itching and feels like she has a yeast infection, she also wants to be checked for aids, patient appears to be very anxious

## 2018-08-11 ENCOUNTER — EMERGENCY (EMERGENCY)
Facility: HOSPITAL | Age: 43
LOS: 1 days | Discharge: HOME | End: 2018-08-11
Attending: EMERGENCY MEDICINE
Payer: COMMERCIAL

## 2018-08-11 VITALS
WEIGHT: 149.91 LBS | HEART RATE: 102 BPM | RESPIRATION RATE: 16 BRPM | TEMPERATURE: 99 F | SYSTOLIC BLOOD PRESSURE: 115 MMHG | OXYGEN SATURATION: 99 % | DIASTOLIC BLOOD PRESSURE: 78 MMHG | HEIGHT: 69 IN

## 2018-08-11 PROCEDURE — 99282 EMERGENCY DEPT VISIT SF MDM: CPT

## 2018-08-11 PROCEDURE — 99283 EMERGENCY DEPT VISIT LOW MDM: CPT

## 2018-08-11 RX ORDER — ACETAMINOPHEN 500 MG
650 TABLET ORAL ONCE
Qty: 0 | Refills: 0 | Status: COMPLETED | OUTPATIENT
Start: 2018-08-11 | End: 2018-08-11

## 2018-08-11 RX ADMIN — Medication 650 MILLIGRAM(S): at 19:55

## 2018-08-11 NOTE — CHART NOTE - NSCHARTNOTEFT_GEN_A_CORE
SWNote: phone call from pt's nurse Madonna requesting transportation assistance for pt to go back home. Worker called [employer] (Samara) requested for pt to go to [address] [place] [address]. [facility]#[id number]. A router will  with taxi company name and ETA. SW to follow.

## 2018-08-11 NOTE — ED ADULT TRIAGE NOTE - CHIEF COMPLAINT QUOTE
pt biba c/o sore throat while shooping at [employer], hx of schizophrenia pt biba c/o sore throat while shopping at [employer], hx of schizophrenia

## 2018-08-11 NOTE — ED STATDOCS - OBJECTIVE STATEMENT
Pt is a 42y F with PMH of Schizophrenia BIBA complaining of sore throat since this morning. She denies any other symptoms. No fever/cough/congestion/vomiting/diarrhea. NKDA. She states I just want some tylenol. No other complaints. She is eating and drinking without issue.

## 2018-08-11 NOTE — ED STATDOCS - CHPI ED SYMPTOM NEG
no weakness/no fever/no nausea/no numbness/no palpitations/no chills/no hematuria/no dysuria/no decreased eating/drinking/no vomiting

## 2018-08-11 NOTE — ED ADULT TRIAGE NOTE - LOCATION:
How Severe Is Your Nodular Acne?: severe
Is This A New Presentation, Or A Follow-Up?: Acne
Females Only: When Was Your Last Menstrual Period?: 1/10/2018
Left arm;

## 2018-08-22 ENCOUNTER — EMERGENCY (EMERGENCY)
Facility: HOSPITAL | Age: 43
LOS: 1 days | Discharge: HOME | End: 2018-08-22
Attending: EMERGENCY MEDICINE
Payer: COMMERCIAL

## 2018-08-22 VITALS — TEMPERATURE: 98 F | RESPIRATION RATE: 18 BRPM | OXYGEN SATURATION: 99 % | HEART RATE: 77 BPM

## 2018-08-22 VITALS
OXYGEN SATURATION: 98 % | RESPIRATION RATE: 16 BRPM | HEIGHT: 70 IN | SYSTOLIC BLOOD PRESSURE: 113 MMHG | WEIGHT: 199.96 LBS | DIASTOLIC BLOOD PRESSURE: 75 MMHG | HEART RATE: 86 BPM | TEMPERATURE: 98 F

## 2018-08-22 PROCEDURE — 99283 EMERGENCY DEPT VISIT LOW MDM: CPT

## 2018-08-22 PROCEDURE — 87081 CULTURE SCREEN ONLY: CPT

## 2018-08-22 NOTE — ED PROVIDER NOTE - OBJECTIVE STATEMENT
43 yo F with pmh schizophrenia presented to ED with c/o sore throat. Pt poor historian. Denies fevers.

## 2018-08-22 NOTE — ED PROVIDER NOTE - ATTENDING CONTRIBUTION TO CARE
After interviewing the patient, I agree with the HPI as discussed . My personal exam reveals findings consistent with those discussed. Available diagnostic studies were reviewed. I confirm the diagnosis as discussed with thePA/NP. The care plan articulated is consistent with our discussion of the patient's particulars. In brief, Hx [Patient complains of sore throat ],Exam [No erythema or exudates seen ], Plan[Patient given supportive care]

## 2018-08-24 LAB
CULTURE RESULTS: SIGNIFICANT CHANGE UP
SPECIMEN SOURCE: SIGNIFICANT CHANGE UP

## 2018-09-16 ENCOUNTER — EMERGENCY (EMERGENCY)
Facility: HOSPITAL | Age: 43
LOS: 1 days | End: 2018-09-16
Attending: EMERGENCY MEDICINE
Payer: COMMERCIAL

## 2018-09-16 VITALS
WEIGHT: 207.01 LBS | DIASTOLIC BLOOD PRESSURE: 75 MMHG | HEART RATE: 65 BPM | TEMPERATURE: 98 F | HEIGHT: 69 IN | RESPIRATION RATE: 18 BRPM | OXYGEN SATURATION: 97 % | SYSTOLIC BLOOD PRESSURE: 118 MMHG

## 2018-09-16 PROCEDURE — 93005 ELECTROCARDIOGRAM TRACING: CPT

## 2018-09-16 PROCEDURE — 93010 ELECTROCARDIOGRAM REPORT: CPT
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
## 2018-09-19 ENCOUNTER — EMERGENCY (EMERGENCY)
Facility: HOSPITAL | Age: 43
LOS: 1 days | Discharge: HOME | End: 2018-09-19
Attending: EMERGENCY MEDICINE
Payer: COMMERCIAL

## 2018-09-19 VITALS — OXYGEN SATURATION: 99 % | HEART RATE: 93 BPM | RESPIRATION RATE: 17 BRPM

## 2018-09-19 VITALS
TEMPERATURE: 98 F | DIASTOLIC BLOOD PRESSURE: 86 MMHG | RESPIRATION RATE: 20 BRPM | SYSTOLIC BLOOD PRESSURE: 124 MMHG | HEART RATE: 96 BPM | OXYGEN SATURATION: 95 %

## 2018-09-19 PROCEDURE — 93005 ELECTROCARDIOGRAM TRACING: CPT

## 2018-09-19 PROCEDURE — 93010 ELECTROCARDIOGRAM REPORT: CPT

## 2018-09-19 PROCEDURE — 99283 EMERGENCY DEPT VISIT LOW MDM: CPT

## 2018-09-19 PROCEDURE — 99284 EMERGENCY DEPT VISIT MOD MDM: CPT

## 2018-09-19 NOTE — ED ADULT TRIAGE NOTE - CHIEF COMPLAINT QUOTE
Pt BIBA ambulatory, pt stated "I have the chest painzees and I couldn't get my cigarettes antonio I have no money, so these guys brought me here."

## 2018-09-19 NOTE — ED PROVIDER NOTE - OBJECTIVE STATEMENT
Pt is a 42yoF with h/o TBI, smoker, presenting with c/c wanting a cigarette to help her with her chest pain. Pt states that she always gets chest pain which is relieved by smoking a cigarette.  Today she was found by EMS searching the ground for a partially used cigarette she could smoke when she told them her chest was hurting. Pt reports midsternal, nonradiating chest pain onset 8 AM - 10PM relieved by a cigarette she states that EMS gave her. She denies any associated symptoms, personal history of CAD or other concerning issues. Pt states that when she does to [facility] they give her cigarettes and that she would rather go home that sustain testing of her heart here in the hospital. PT offered nicotine patch but refused, asking to leave. Pt advised to f/u with her doctor and to return for any worsening chest pain

## 2018-09-19 NOTE — ED ADULT NURSE REASSESSMENT NOTE - NS ED NURSE REASSESS COMMENT FT1
Patient noted to be eating food, denies chest pain, sob, or generalized discomfort at this time ; pt states she must stay here until she has money to buy cigarettes; pt educated regarding current chief compliant

## 2018-09-19 NOTE — ED PROVIDER NOTE - MEDICAL DECISION MAKING DETAILS
Pt presents c/o chest pain that resolved w/ smoking. Pt refusing any testing or intervention now that she was told she would not be given cigarettes.  Pt advised to f/u outpatient. EKG normal.

## 2018-09-19 NOTE — ED ADULT NURSE NOTE - OBJECTIVE STATEMENT
Patient states she "does not have money to buy cigarettes and needs to stay here until I get my money on the 1st"

## 2018-09-19 NOTE — CHART NOTE - NSCHARTNOTEFT_GEN_A_CORE
SOCIAL WORK NOTE:  RN CALLED TO STATE PATIENT IN WAITING ROOM AND NEEDS MEDICAID TAXI TO HOME.  MET WITH PATIENT ALREADY DISCHARGED IN WAITING ROOM.  PATIENT CONFIRMED HER ADDRESS TO BE THE TRAVEL LODGE AT [address] [place] [address]. NIRAJ WILLIAMSON REQUESTED.

## 2018-11-20 ENCOUNTER — EMERGENCY (EMERGENCY)
Facility: HOSPITAL | Age: 43
LOS: 1 days | Discharge: HOME | End: 2018-11-20
Attending: EMERGENCY MEDICINE
Payer: COMMERCIAL

## 2018-11-20 VITALS
DIASTOLIC BLOOD PRESSURE: 80 MMHG | OXYGEN SATURATION: 99 % | RESPIRATION RATE: 18 BRPM | HEART RATE: 108 BPM | TEMPERATURE: 98 F | SYSTOLIC BLOOD PRESSURE: 120 MMHG

## 2018-11-20 VITALS — HEIGHT: 70 IN | WEIGHT: 207.01 LBS

## 2018-11-20 PROCEDURE — 99284 EMERGENCY DEPT VISIT MOD MDM: CPT

## 2018-11-20 NOTE — ED PROVIDER NOTE - MEDICAL DECISION MAKING DETAILS
41 yo F from shelter p/w heache that resolved, no complaints at the moment. will give her food and social work consult for placement.

## 2018-11-20 NOTE — CHART NOTE - NSCHARTNOTEFT_GEN_A_CORE
SW Note – pt well known to this SW from previous [facility] visits, pt reports not liking her shelter housing 2/2 "they can't handle my beauty so they mean to me" SW discussed options with pt - return to shelter and request assistance with new placement from DSS in AM, stay with friends or relatives till a new placement is found or stay in local motel - pt declined these options and insisted that SW call "those D)@($* people at welfare and get me a new shelter!"  Educated pt on shelter placement system and why [facility] SW can not assist her with a new placement. pt belligerent and occasionally incoherent - USOH for this pt. Offer pt assistance with transport and pt declined. Discussed case with MD Tompkins and pt to be DC home.

## 2018-11-20 NOTE — ED ADULT TRIAGE NOTE - CHIEF COMPLAINT QUOTE
43y/o female c/o headache x 2 days. Pt denies N/V, light sensitive, numbness or tingling. +ROM to all extremities. Pt states "I think I drank too much coffee" will continue to monitor

## 2018-11-20 NOTE — ED ADULT NURSE NOTE - CHPI ED NUR SYMPTOMS NEG
no nausea/no change in level of consciousness/no loss of consciousness/no seizure/no syncope/no blurred vision/no vomiting/no weakness/no confusion/no dizziness

## 2018-11-20 NOTE — ED PROVIDER NOTE - NS ED ROS FT
Review of Systems  •	CONSTITUTIONAL - no  fever, no diaphoresis, no weight change  •	SKIN - no rash  •	HEMATOLOGIC - no bleeding, no bruising  •	EYES - no eye pain, no blurred vision  •	ENT - no change in hearing, no pain  •	RESPIRATORY - no shortness of breath, no cough  •	CARDIAC - no chest pain, no palpitations  •	GI - no abd pain, no nausea, no vomiting, no diarrhea, no constipation, no bleeding  •	GENITO-URINARY - no discharge, no dysuria; no hematuria,   •	ENDO - no polydypsia, no polyurea, no heat/no cold intolerance  •	MUSCULOSKELETAL - no joint pain, no swelling, no redness  •	NEUROLOGIC - no weakness, (+) headache, no anesthesia, no paresthesias  •	PSYCH - no anxiety, non suicidal, non homicidal, no hallucination, no depression

## 2018-11-20 NOTE — ED ADULT NURSE NOTE - NSIMPLEMENTINTERV_GEN_ALL_ED
Implemented All Universal Safety Interventions:  [place] to call system. Call bell, personal items and telephone within reach. Instruct patient to call for assistance. Room bathroom lighting operational. Non-slip footwear when patient is off stretcher. Physically safe environment: no spills, clutter or unnecessary equipment. Stretcher in lowest position, wheels locked, appropriate side rails in place.
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
## 2018-11-23 ENCOUNTER — EMERGENCY (EMERGENCY)
Facility: HOSPITAL | Age: 43
LOS: 1 days | Discharge: HOME | End: 2018-11-23
Attending: EMERGENCY MEDICINE
Payer: COMMERCIAL

## 2018-11-23 VITALS
RESPIRATION RATE: 20 BRPM | HEART RATE: 98 BPM | DIASTOLIC BLOOD PRESSURE: 93 MMHG | TEMPERATURE: 98 F | OXYGEN SATURATION: 98 % | SYSTOLIC BLOOD PRESSURE: 146 MMHG

## 2018-11-23 PROCEDURE — 99283 EMERGENCY DEPT VISIT LOW MDM: CPT

## 2018-11-23 PROCEDURE — 99284 EMERGENCY DEPT VISIT MOD MDM: CPT | Mod: 25

## 2018-11-23 NOTE — ED ADULT TRIAGE NOTE - CHIEF COMPLAINT QUOTE
Pt BIBA c/o headache.  Pt states headache onset because she did not want to eat at group home.  Pt states headache has subsided at this pt but just wants to eat something.

## 2018-11-23 NOTE — ED ADULT NURSE NOTE - NSIMPLEMENTINTERV_GEN_ALL_ED
Implemented All Universal Safety Interventions:  [place] to call system. Call bell, personal items and telephone within reach. Instruct patient to call for assistance. Room bathroom lighting operational. Non-slip footwear when patient is off stretcher. Physically safe environment: no spills, clutter or unnecessary equipment. Stretcher in lowest position, wheels locked, appropriate side rails in place.

## 2018-11-23 NOTE — ED ADULT NURSE NOTE - OBJECTIVE STATEMENT
Pt A&Ox4 c/o having had headache does not have one at this time, is requesting food. Pt resting comfortably, VSS, no signs of distress at this time

## 2018-11-23 NOTE — ED PROVIDER NOTE - OBJECTIVE STATEMENT
Patient is a 43 y/o female currently homeless presenting to the ED. Patient was seen at [facility] two days ago, complaining of a headache that resolved by the time she came to the ED, patient stating at last visit that the shelter she was in was terrible and she wanted to switch to a new shelter. Patient states she did not go back to the shelter after her release from the hospital tow days ago. Patient stating she is coming to the ED tonight for food, states she has not ate in two days. Patient is a 43 y/o female currently homeless presenting to the ED. Patient was seen at [facility] two days ago, complaining of a headache that resolved by the time she came to the ED, patient stating at last visit that the shelter she was in was terrible and she wanted to switch to a new shelter. Patient states she did not go back to the shelter after her release from the hospital tow days ago. Patient stating she is coming to the ED tonight for food, states she has not ate in two days. Patient denies visual changes, dizziness, headache, neck pain, abdominal pain, nausea, vomiting, neck pain, vomiting, diarrhea.

## 2018-11-23 NOTE — ED PROVIDER NOTE - MEDICAL DECISION MAKING DETAILS
Patient currently has no complaints, stating she came into the ED because she is homeless and wants to eat, will provide patient with food

## 2018-11-23 NOTE — ED PROVIDER NOTE - PHYSICAL EXAMINATION
Const: Awake, alert and oriented. In no acute distress.   HEENT: NC/AT. Moist mucous membranes.  Eyes: Conjunctiva pink, sclera white bilaterally. EOMI.  Neck:. Soft and supple. Full ROM without pain.  Cardiac: +S1/S2. No murmurs.   Resp: Speaking in full sentences. No evidence of respiratory distress. No wheezes, rales or rhonchi.  Abd: Soft, non-tender, non-distended. Normal bowel sounds in all 4 quadrants. No guarding or rebound.  Back: Spine midline and non-tender. No CVAT.  Skin: No rashes, abrasions or lacerations.  Lymph: No cervical lymphadenopathy.  Neuro: Awake, alert & oriented x 3. CN II-XII intact, finger to nose intact, neurovascularly intact, muscle strength fair, gait without ataxia, reflexes intact

## 2018-11-23 NOTE — ED ADULT NURSE NOTE - CHPI ED NUR SYMPTOMS NEG
no dizziness/no fever/no decreased eating/drinking/no tingling/no chills/no pain/no vomiting/no weakness/no nausea

## 2018-11-23 NOTE — ED PROVIDER NOTE - ATTENDING CONTRIBUTION TO CARE
[id number] to emergency room with no complaints requesting to rest, Requesting for food no active complaints described.Patient to discharge for  in the morning

## 2018-12-17 ENCOUNTER — EMERGENCY (EMERGENCY)
Facility: HOSPITAL | Age: 43
LOS: 1 days | Discharge: HOME | End: 2018-12-17
Attending: EMERGENCY MEDICINE
Payer: COMMERCIAL

## 2018-12-17 VITALS
SYSTOLIC BLOOD PRESSURE: 111 MMHG | HEART RATE: 99 BPM | RESPIRATION RATE: 19 BRPM | DIASTOLIC BLOOD PRESSURE: 76 MMHG | TEMPERATURE: 98 F | OXYGEN SATURATION: 99 %

## 2018-12-17 VITALS
WEIGHT: 207.01 LBS | SYSTOLIC BLOOD PRESSURE: 111 MMHG | OXYGEN SATURATION: 99 % | RESPIRATION RATE: 18 BRPM | HEIGHT: 69 IN | DIASTOLIC BLOOD PRESSURE: 76 MMHG | HEART RATE: 99 BPM | TEMPERATURE: 98 F

## 2018-12-17 PROCEDURE — 99284 EMERGENCY DEPT VISIT MOD MDM: CPT

## 2018-12-17 PROCEDURE — 99283 EMERGENCY DEPT VISIT LOW MDM: CPT

## 2018-12-17 NOTE — ED PROVIDER NOTE - ATTENDING CONTRIBUTION TO CARE
42 year old woman hx of depression and schizophrenia who presents to the ER c/o abdominal pain and one episode of vomiting after eating a breakfast sandwich.  Patient denies symptoms at this time.  Abdomen soft, non-tender.  No vomiting in the ER.

## 2018-12-17 NOTE — ED PROVIDER NOTE - MEDICAL DECISION MAKING DETAILS
42 y female pmhx depression, schizophrenia presents to ED with cc of abdominal pain and vomiting x 1. Symptoms have resolved, no complaints at this time. Patient asking to go home, Will discharge

## 2018-12-17 NOTE — ED PROVIDER NOTE - OBJECTIVE STATEMENT
42 y female pmhx depression, schizophrenia presents to ED with cc of abdominal pain and vomiting x 1. Patient states that she had 1 episode of vomiting after she ate a large breakfast sandwich this morning around 0800. She states she has no complaints at this time and she feels "fine". Patient is asking for her discharge papers and a cup of coffee. Patient denies abdominal pain, nausea, diarrhea, abdominal distention, chest pain, SOB, fever, chills.

## 2018-12-17 NOTE — ED ADULT NURSE NOTE - OBJECTIVE STATEMENT
patient stated that she had abdominal pain earlier, feeling better at this moment. no other complaints.

## 2018-12-19 ENCOUNTER — EMERGENCY (EMERGENCY)
Facility: HOSPITAL | Age: 43
LOS: 1 days | Discharge: HOME | End: 2018-12-19
Attending: STUDENT IN AN ORGANIZED HEALTH CARE EDUCATION/TRAINING PROGRAM
Payer: COMMERCIAL

## 2018-12-19 VITALS
WEIGHT: 179.9 LBS | HEART RATE: 97 BPM | HEIGHT: 68 IN | SYSTOLIC BLOOD PRESSURE: 118 MMHG | DIASTOLIC BLOOD PRESSURE: 79 MMHG | RESPIRATION RATE: 20 BRPM | TEMPERATURE: 99 F | OXYGEN SATURATION: 97 %

## 2018-12-19 LAB
APPEARANCE UR: CLEAR — SIGNIFICANT CHANGE UP
BILIRUB UR-MCNC: NEGATIVE — SIGNIFICANT CHANGE UP
COLOR SPEC: YELLOW — SIGNIFICANT CHANGE UP
DIFF PNL FLD: NEGATIVE — SIGNIFICANT CHANGE UP
EPI CELLS # UR: SIGNIFICANT CHANGE UP
GLUCOSE UR QL: NEGATIVE MG/DL — SIGNIFICANT CHANGE UP
HCG UR QL: NEGATIVE — SIGNIFICANT CHANGE UP
KETONES UR-MCNC: ABNORMAL
LEUKOCYTE ESTERASE UR-ACNC: ABNORMAL
NITRITE UR-MCNC: NEGATIVE — SIGNIFICANT CHANGE UP
PH UR: 5 — SIGNIFICANT CHANGE UP (ref 5–8)
PROT UR-MCNC: 30 MG/DL
RBC CASTS # UR COMP ASSIST: NEGATIVE /HPF — SIGNIFICANT CHANGE UP (ref 0–4)
SP GR SPEC: 1.02 — SIGNIFICANT CHANGE UP (ref 1.01–1.02)
UROBILINOGEN FLD QL: 1 MG/DL
WBC UR QL: SIGNIFICANT CHANGE UP

## 2018-12-19 PROCEDURE — 81025 URINE PREGNANCY TEST: CPT

## 2018-12-19 PROCEDURE — 99284 EMERGENCY DEPT VISIT MOD MDM: CPT

## 2018-12-19 PROCEDURE — 87591 N.GONORRHOEAE DNA AMP PROB: CPT

## 2018-12-19 PROCEDURE — 87491 CHLMYD TRACH DNA AMP PROBE: CPT

## 2018-12-19 PROCEDURE — 87086 URINE CULTURE/COLONY COUNT: CPT

## 2018-12-19 PROCEDURE — 81001 URINALYSIS AUTO W/SCOPE: CPT

## 2018-12-19 RX ORDER — CEFTRIAXONE 500 MG/1
250 INJECTION, POWDER, FOR SOLUTION INTRAMUSCULAR; INTRAVENOUS ONCE
Qty: 0 | Refills: 0 | Status: COMPLETED | OUTPATIENT
Start: 2018-12-19 | End: 2018-12-19

## 2018-12-19 RX ORDER — FLUCONAZOLE 150 MG/1
100 TABLET ORAL ONCE
Qty: 0 | Refills: 0 | Status: COMPLETED | OUTPATIENT
Start: 2018-12-19 | End: 2018-12-19

## 2018-12-19 RX ORDER — AZITHROMYCIN 500 MG/1
1000 TABLET, FILM COATED ORAL ONCE
Qty: 0 | Refills: 0 | Status: COMPLETED | OUTPATIENT
Start: 2018-12-19 | End: 2018-12-19

## 2018-12-19 NOTE — ED PROVIDER NOTE - GENITOURINARY, MLM
No CMT or adnexal tenderness. Thick yellowish/white discharge noted, cottage cheese like. No rashes or lesions. Vulva within normal.

## 2018-12-19 NOTE — ED PROVIDER NOTE - CHIEF COMPLAINT
[facility]  [address], [address]  [facility] - BE[facility], [address] [place] [address]    [facility] [address]     COPD (chronic obstructive pulmonary disease) [address]     HFrEF (heart failure with reduced ejection fraction) [address]     The patient is a 42y Female complaining of vaginal itching. 

## 2018-12-19 NOTE — ED PROVIDER NOTE - OBJECTIVE STATEMENT
41 y/o F with PMHx of depression, schizophrenia and suicidal behavior presents to the ED c/o vaginal burning onset today. Associated burning with urination, vaginal discharge and bleeding. States "I know I have full blown AIDS, I can just feel it". She states the last time she had sexual intercourse was over 10 years ago and does not use IV drugs, but is requesting to get checked for AIDS. Denies back pain, abdominal pain, nausea, vomiting or recent fevers/chills. No further acute complaints at this time.

## 2018-12-19 NOTE — ED PROVIDER NOTE - MEDICAL DECISION MAKING DETAILS
41 y/o F with PMHx of depression, schizophrenia and suicidal behavior presents to the ED c/o vaginal burning onset today with associated burning with urination, vaginal discharge and bleeding. 43 y/o F with PMHx of depression, schizophrenia and suicidal behavior presents to the ED c/o vaginal burning onset today with associated burning with urination, vaginal discharge and bleeding - exam with possible yeast infection will take otc does not want meds at this time

## 2018-12-19 NOTE — ED CLERICAL - CLERICAL COMMENTS
[employer] called [phone]. Reservation # [id number] [employer] called [phone]. Reservation # [id number]. [employer]  @ [phone]

## 2018-12-19 NOTE — ED PROVIDER NOTE - PROGRESS NOTE DETAILS
Pt at this time states she does not want to be tested for HIV and she would like to now wait for her GC results before getting treated. Told she will be called if positive. Pt states she will take monistat OTC for possible yeast infection since she wants to be dc'd at this time. VSS, NAD, ambulating without issues, clinically sober, stable for dc

## 2018-12-21 LAB
C TRACH RRNA SPEC QL NAA+PROBE: SIGNIFICANT CHANGE UP
CULTURE RESULTS: SIGNIFICANT CHANGE UP
N GONORRHOEA RRNA SPEC QL NAA+PROBE: SIGNIFICANT CHANGE UP
SPECIMEN SOURCE: SIGNIFICANT CHANGE UP
SPECIMEN SOURCE: SIGNIFICANT CHANGE UP

## 2019-01-03 ENCOUNTER — EMERGENCY (EMERGENCY)
Facility: HOSPITAL | Age: 44
LOS: 1 days | Discharge: HOME | End: 2019-01-03
Attending: EMERGENCY MEDICINE
Payer: COMMERCIAL

## 2019-01-03 VITALS
HEART RATE: 91 BPM | OXYGEN SATURATION: 100 % | HEIGHT: 70 IN | RESPIRATION RATE: 18 BRPM | SYSTOLIC BLOOD PRESSURE: 112 MMHG | TEMPERATURE: 99 F | WEIGHT: 237 LBS | DIASTOLIC BLOOD PRESSURE: 76 MMHG

## 2019-01-03 PROCEDURE — 99283 EMERGENCY DEPT VISIT LOW MDM: CPT

## 2019-01-03 PROCEDURE — 99284 EMERGENCY DEPT VISIT MOD MDM: CPT | Mod: 25
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
## 2019-01-04 ENCOUNTER — EMERGENCY (EMERGENCY)
Facility: HOSPITAL | Age: 44
LOS: 1 days | Discharge: HOME | End: 2019-01-04
Attending: EMERGENCY MEDICINE
Payer: COMMERCIAL

## 2019-01-04 ENCOUNTER — EMERGENCY (EMERGENCY)
Facility: HOSPITAL | Age: 44
LOS: 1 days | Discharge: LEFT BEFORE DISCHARGE | End: 2019-01-04
Payer: COMMERCIAL

## 2019-01-04 VITALS
OXYGEN SATURATION: 100 % | DIASTOLIC BLOOD PRESSURE: 75 MMHG | TEMPERATURE: 99 F | HEIGHT: 70 IN | SYSTOLIC BLOOD PRESSURE: 114 MMHG | RESPIRATION RATE: 20 BRPM | WEIGHT: 240.08 LBS | HEART RATE: 100 BPM

## 2019-01-04 VITALS
DIASTOLIC BLOOD PRESSURE: 79 MMHG | TEMPERATURE: 98 F | HEART RATE: 86 BPM | WEIGHT: 293 LBS | OXYGEN SATURATION: 99 % | RESPIRATION RATE: 18 BRPM | HEIGHT: 71 IN | SYSTOLIC BLOOD PRESSURE: 119 MMHG

## 2019-01-04 PROCEDURE — 99281 EMR DPT VST MAYX REQ PHY/QHP: CPT

## 2019-01-04 PROCEDURE — 99283 EMERGENCY DEPT VISIT LOW MDM: CPT

## 2019-01-04 RX ORDER — IBUPROFEN 200 MG
600 TABLET ORAL ONCE
Qty: 0 | Refills: 0 | Status: DISCONTINUED | OUTPATIENT
Start: 2019-01-04 | End: 2019-01-08

## 2019-01-04 RX ORDER — IBUPROFEN 200 MG
800 TABLET ORAL ONCE
Qty: 0 | Refills: 0 | Status: COMPLETED | OUTPATIENT
Start: 2019-01-04 | End: 2019-01-04

## 2019-01-04 RX ADMIN — Medication 800 MILLIGRAM(S): at 20:14

## 2019-01-04 NOTE — ED PROVIDER NOTE - CHPI ED SYMPTOMS NEG
no numbness/no vomiting/no nausea/no tingling/no dizziness/no fever/no chills/no weakness/no decreased eating/drinking
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
## 2019-01-04 NOTE — ED STATDOCS - PHYSICAL EXAMINATION
Constitutional: in no apparent distress, speaking in full sentences  HEENT: neck supple, FROM, tongue is pink, moist and midline  EYES: non-injected, non-icteric, PERRL, EOMI  RESPIRATORY: lungs clear  CARDIAC: S1 S2, regular rate, moving chest wall symmetrically, no pedal edema  GI: bowel sounds present, abdomen soft, non-tender  : no CVA tenderness  MSK: spine is midline, no calf tenderness  SKIN: no jaundice  NEURO: awake and alert

## 2019-01-04 NOTE — ED ADULT NURSE NOTE - NSIMPLEMENTINTERV_GEN_ALL_ED
Implemented All Universal Safety Interventions:  [place] to call system. Call bell, personal items and telephone within reach. Instruct patient to call for assistance. Room bathroom lighting operational. Non-slip footwear when patient is off stretcher. Physically safe environment: no spills, clutter or unnecessary equipment. Stretcher in lowest position, wheels locked, appropriate side rails in place.
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
## 2019-01-04 NOTE — ED STATDOCS - OBJECTIVE STATEMENT
42 y/o F, with hx of schizophrenia and depression, presents to the ED c/o abdominal cramping, onset 4 days ago.  Pt states that she currently has her menstrual period and is having lower abdominal cramping.  Pt states that she does not usually have cramps during her period.  Able to tolerate PO.  Denies fevers, N/V/D, dysuria, vaginal discharge, chest pain, cough, SOB, or back pain. Denies illicit drug use.  Denies following with a primary care physician.  Pt states that she is currently homeless and would like to speak with Social Work for housing placement.

## 2019-02-03 ENCOUNTER — EMERGENCY (EMERGENCY)
Facility: HOSPITAL | Age: 44
LOS: 1 days | End: 2019-02-03
Attending: EMERGENCY MEDICINE
Payer: COMMERCIAL

## 2019-02-03 VITALS
SYSTOLIC BLOOD PRESSURE: 132 MMHG | TEMPERATURE: 98 F | DIASTOLIC BLOOD PRESSURE: 84 MMHG | WEIGHT: 266.98 LBS | HEART RATE: 108 BPM | OXYGEN SATURATION: 100 % | HEIGHT: 69 IN | RESPIRATION RATE: 20 BRPM

## 2019-02-03 VITALS
TEMPERATURE: 98 F | HEART RATE: 88 BPM | SYSTOLIC BLOOD PRESSURE: 138 MMHG | DIASTOLIC BLOOD PRESSURE: 86 MMHG | OXYGEN SATURATION: 100 % | RESPIRATION RATE: 18 BRPM

## 2019-02-03 PROCEDURE — 99283 EMERGENCY DEPT VISIT LOW MDM: CPT

## 2019-02-03 PROCEDURE — 99284 EMERGENCY DEPT VISIT MOD MDM: CPT | Mod: 25

## 2019-02-03 RX ORDER — ACETAMINOPHEN 500 MG
650 TABLET ORAL ONCE
Qty: 0 | Refills: 0 | Status: COMPLETED | OUTPATIENT
Start: 2019-02-03 | End: 2019-02-03

## 2019-02-03 RX ADMIN — Medication 650 MILLIGRAM(S): at 05:35

## 2019-02-03 RX ADMIN — Medication 650 MILLIGRAM(S): at 06:54

## 2019-02-03 NOTE — ED PROVIDER NOTE - OBJECTIVE STATEMENT
42 y/o F presents to the ED c/o headache. Pt is well known to ER, homeless. She states has not eaten food for 2 days. Denies illicit drug use or EtOH intake. Denies fever, chills, nausea/vomiting.

## 2019-02-03 NOTE — ED PROVIDER NOTE - NS ED ROS FT
no weight change, no fever or chills  no recent travel, no recent abox, no sick contacts  no recent change in medications  no rash, no bruises  no visual changes no eye discharge  no cough cold or congestion,   no sob, no chest pain  no stress test, no cath  no orthopnea, no pnd  no abd pain, no n/v/d  no endoscopy, no colonoscopy  no hematuria, no change in urinary habits  no joint pain, no deformity  +headache, no paresthesia

## 2019-02-03 NOTE — ED ADULT TRIAGE NOTE - CHIEF COMPLAINT QUOTE
patient arrived via ambulance - patient states that she was having a headache - patient states that she is homeless and was acting out a little - patient changed into yellow gown at this time.

## 2019-02-03 NOTE — ED PROVIDER NOTE - PHYSICAL EXAMINATION
Constitutional: Appears comfortably, talking in full sentences, foul odor, hair is not matted  Head: NC/AT, no swelling  Eyes: EOMI, no swelling  Mouth: mm moist  Neck: supple, trachea is midline  Chest: Bilateral air entry, symmetrical chest expansion, no distress  Heart: S1 S2 distant  Abdomen: abd soft, non tender  Musc/Skel: extremities with no swelling, no deformity, no spine tenderness, distal pulses present  Neuro: A&Ox3, no focal deficits

## 2019-02-03 NOTE — ED ADULT NURSE NOTE - OBJECTIVE STATEMENT
Patient A&Ox4, received in yellow gown, complaining of headache that began this morning. Denies any dizziness. Denies any alcohol or drug use.

## 2019-02-03 NOTE — ED PROVIDER NOTE - MEDICAL DECISION MAKING DETAILS
42 y/o F homeless presents to ED for headache, not eating for 2 days, plan to feed pt, give Tylenol and re-evaluate.

## 2019-07-29 NOTE — ED STATDOCS - RESPIRATORY, MLM
breath sounds clear and equal bilaterally. Perilesional Excision Additional Text (Leave Blank If You Do Not Want): The margin was drawn around the clinically apparent lesion. Incisions were then made along these lines to the appropriate tissue plane and the lesion was extirpated.

## 2019-08-26 NOTE — ED STATDOCS - NS ED MD EM SELECTION
Attempted to call pt to review medications for upcoming DCCV but pt is on her way to therapy so she will call back once she gets back home.   
CMA letter completed.   
Dr. Benson,  Please review labs and update H&P.    
Leyda from pre admission is calling to get h&p, ekg faxed to [phone]    Labs are abnormal as well has Dr. Benson reviewed them per Leyda.  
Packet signed, faxed, and filed.  
Patient calling for clarification of her CT order and returning the call to discuss instructions for upcoming Cardioversion.  Medication list reviewed and updated.   Patient verbalizes understanding of all of her pre-operative instructions: location, time, NPO, hypoglycemic medication instructions, meds to hold.   Patient instruction letter created and place in the mail as patient requested.  Orders letter created and ready for Dr. Marcelino reyna.    Message sent to CMA pool to complete the task.  
Patient scheduled with Clarisa at [facility] on 8/28/19 at 8:00 with 6:30am arrival.  Booked in Epic.     Informed patient to have labs in 2 weeks.   
[id number] Detailed

## 2019-11-04 NOTE — ED ADULT TRIAGE NOTE - HEIGHT IN FEET
5 Include Z78.9 (Other Specified Conditions Influencing Health Status) As An Associated Diagnosis?: No

## 2020-01-01 NOTE — ED PROVIDER NOTE - CHPI ED SYMPTOMS POS
menstrual cramps/HEADACHE
I will START or STAY ON the medications listed below when I get home from the hospital:  None

## 2020-02-13 ENCOUNTER — APPOINTMENT (OUTPATIENT)
Dept: DERMATOLOGY | Facility: CLINIC | Age: 45
End: 2020-02-13
Payer: MEDICAID

## 2020-02-13 PROCEDURE — 99202 OFFICE O/P NEW SF 15 MIN: CPT
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
## 2020-04-01 ENCOUNTER — OUTPATIENT (OUTPATIENT)
Dept: OUTPATIENT SERVICES | Facility: HOSPITAL | Age: 45
LOS: 1 days | End: 2020-04-01

## 2020-04-24 ENCOUNTER — EMERGENCY (EMERGENCY)
Facility: HOSPITAL | Age: 45
LOS: 1 days | Discharge: HOME | End: 2020-04-24
Attending: STUDENT IN AN ORGANIZED HEALTH CARE EDUCATION/TRAINING PROGRAM
Payer: MEDICAID

## 2020-04-24 VITALS
HEART RATE: 76 BPM | DIASTOLIC BLOOD PRESSURE: 83 MMHG | RESPIRATION RATE: 18 BRPM | WEIGHT: 255.07 LBS | OXYGEN SATURATION: 94 % | HEIGHT: 69 IN | SYSTOLIC BLOOD PRESSURE: 126 MMHG | TEMPERATURE: 98 F

## 2020-04-24 PROCEDURE — 99283 EMERGENCY DEPT VISIT LOW MDM: CPT

## 2020-04-24 PROCEDURE — 99282 EMERGENCY DEPT VISIT SF MDM: CPT

## 2020-04-24 NOTE — ED PROVIDER NOTE - PATIENT PORTAL LINK FT
You can access the FollowMyHealth Patient Portal offered by [facility] by registering at the following website: [email or web address]. By joining [employer]’s FollowMyHealth portal, you will also be able to view your health information using other applications (apps) compatible with our system.

## 2020-04-24 NOTE — ED PROVIDER NOTE - ATTENDING CONTRIBUTION TO CARE
43yo female with pmh of schizophrenia and depression presents because she drank 5 cups of coffee 2 hours ago and didn't urinate yet but during interview states she needs to urinate now, which she did. Pt also requesting food. no other complaints  Const: Awake, alert and oriented. In no acute distress. Well appearing.  HEENT: NC/AT. Moist mucous membranes.  Eyes: No scleral icterus. EOMI.  Neck:. Soft and supple. Full ROM without pain.  Cardiac: Regular rate and regular rhythm. +S1/S2. Peripheral pulses 2+ and symmetric. No LE edema.  Resp: Speaking in full sentences. No evidence of respiratory distress. No wheezes, rales or rhonchi.  Abd: Soft, non-tender, non-distended. Normal bowel sounds in all 4 quadrants. No guarding or rebound.  Back: Spine midline and non-tender. No CVAT.  Skin: No rashes, abrasions or lacerations.  Lymph: No cervical lymphadenopathy.  Neuro: Awake, alert & oriented x 3. Moves all extremities symmetrically.  Psych: no si/hi/vh/ah  food, dc

## 2020-04-24 NOTE — ED PROVIDER NOTE - NSFOLLOWUPINSTRUCTIONS_ED_ALL_ED_FT
- Please call to schedule follow up appointment with your primary care physician within 24-48 hours.  - Please seek immediate medical attention for any new/worsening, signs/symptoms, or concerns.

## 2020-04-24 NOTE — ED ADULT TRIAGE NOTE - CHIEF COMPLAINT QUOTE
Pt BIBA AOx3, ambulatory into triage, states "I drank 5 cups of coffee and I haven't peed yet." States last urinary output 2 hours prior to EMS arrival. Denies known +Covid-19 contact

## 2020-04-24 NOTE — ED PROVIDER NOTE - CLINICAL SUMMARY MEDICAL DECISION MAKING FREE TEXT BOX
45 yo female PMHx schizophrenia, depression presents to ED BIBA c/o "I drank 5 XL cups of coffee and I haven't peed yet." Patient last urinated 2 hours PTA. Patient now has urge to urinate and has successfully done so in ED.
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
## 2020-04-29 DIAGNOSIS — Z71.89 OTHER SPECIFIED COUNSELING: ICD-10-CM

## 2020-05-01 ENCOUNTER — OUTPATIENT (OUTPATIENT)
Dept: OUTPATIENT SERVICES | Facility: HOSPITAL | Age: 45
LOS: 1 days | End: 2020-05-01

## 2020-05-06 DIAGNOSIS — Z71.89 OTHER SPECIFIED COUNSELING: ICD-10-CM

## 2020-08-05 NOTE — ED ADULT NURSE NOTE - NS ED NURSE LEVEL OF CONSCIOUSNESS MENTAL STATUS
From: Ly Gambino.   To: Verona Posada MD  Sent: 8/5/2020 9:52 AM EDT  Subject: Non-Urgent Medical Question    Can you please Refill my pain meds for me Oxycodone 10 mg One every 8 hours thank you and have a great day Awake

## 2020-09-22 NOTE — ED PROVIDER NOTE - ATTESTATION, MLM
I have reviewed and confirmed nurses' notes for patient's medications, allergies, medical history, and surgical history. Subsequent Stages Histo Method Verbiage: Using a similar technique to that described above, a thin layer of tissue was removed from all areas where tumor was visible on the previous stage.  The tissue was again oriented, mapped, dyed, and processed as above.

## 2022-01-28 NOTE — ED BEHAVIORAL HEALTH ASSESSMENT NOTE - PATIENT'S CHIEF COMPLAINT
PRE-OP DIAGNOSIS:  Recurrent right inguinal hernia 28-Jan-2022 08:53:15  Michael Esposito  Incarcerated ventral hernia 28-Jan-2022 08:53:04  Michael Esposito  
"just hanging out"

## 2022-02-23 NOTE — ED ADULT NURSE NOTE - NSHISCREENINGQ1_ED_A_ED
Received call from patient requesting refills for cymbalta 30 & 60 mg and seroquel      Patient has also scheduled follow up apt with Lukas Martínez for 4/25 No

## 2022-02-28 NOTE — ED ADULT NURSE NOTE - PRIVACY CONCERNS
Homero Bernstein Patient Age: 81 year old  MESSAGE: Interpreting service used: No      IM/FP- Patient states he would like to speak to a nurse.     Patient states he has information regarding a COVID shot and Pneumonia shot.  Message confirm with caller.  caller connected to [facility] [place] triage queue         ALLERGIES:  Sulfa antibiotics  Current Outpatient Medications   Medication   • cephalexin (Keflex) 500 MG capsule   • ALPRAZolam (XANAX) 0.5 MG tablet   • amLODIPine (NORVASC) 2.5 MG tablet   • QUEtiapine (SEROquel) 50 MG tablet   • sertraline (ZOLOFT) 100 MG tablet   • rosuvastatin (CRESTOR) 5 MG tablet   • Bacillus Coagulans-Inulin (ALIGN PREBIOTIC-PROBIOTIC PO)   • Methylcobalamin (B12-ACTIVE PO)   • Multiple Vitamin (MULTI-VITAMINS) Tab   • polyethylene glycol (MIRALAX) powder     No current facility-administered medications for this visit.     Facility-Administered Medications Ordered in Other Visits   Medication   • fluorodeoxyglucose F-18 (FDG) injection 11.83 [facility]     PHARMACY to use: Please ask patient if needed            Pharmacy preference(s) on file:   [id number] - [place], [place] - [address] AT [facility] [address] [place] [address]  [address] [facility] [address]  Phone: [phone] Fax: [phone]      CALL BACK INFO: Ok to leave response (including medical information) on answering machine      PCP: Shoaib Duron MD         INS: Payor: [facility] MEDICARE SOLUTIONS / Plan: GROUP MEDICARE ADV PPO / Product Type: MEDICARE   PATIENT ADDRESS:  [address] [place] [id number]    
Immunization record updated.    Not sure if anything else was needed.  
Noted   
Patient calling to update vaccine     Received 4th Covid  2/22  [facility] pharmacy    Also Pneumonia, patient did not know which one  Advised to follow up with pharmacy so we can up date records correctly.    Patient will call back - advised can leave information with PSR so does not need to hold for nurse line.  
Patient returning phone call with missing information needed for nurse    Patient states Pneumonia Vaccine received was [id number] on 2.22.22 at [facility]      
No

## 2022-06-30 NOTE — ED PROVIDER NOTE - TEMPLATE
Patient awake and alert, rr unlabored, skin warm and dry. VSS. Discharge instructions provided and explained to parent. Tylenol/motrin dosing pamphlet provided and explained - went over frequency and dosing.Parent verbalized understanding of all instructions. Parent aware to follow up with ENT as instructed.  Aware to come back to ED if any difficulty breathing, not tolerating PO, uncontrolled vomiting or worsening symptoms. All questions were answered. Patient discharged home in stable condition. I  
Psych/Behavioral

## 2022-09-08 NOTE — ED STATDOCS - CROS ED GI ALL NEG
Pt is confused by lab results because she thinks it states that she is 32 wks pregnant and pt is in early stages of menopause. Also noted in her appt notes from last visit that  States that pt is on medications and she told both the dr and nurse at visit that she is on no medication.   - - -

## 2022-09-08 NOTE — ED ADULT NURSE NOTE - IN THE PAST YEAR, HOW OFTEN HAVE YOU USED TOBACCO PRODUCTS?
Failed HSAT. Repeat HSAT.   Inform patient and schedule     Poor airflow and finger oxygen sensor signal
Patient is scheduled for repeat HSAT per Dr. Phyllis Arias.
Daily or almost daily

## 2022-09-22 NOTE — ED ADULT TRIAGE NOTE - BANDS:
Detail Level: Simple
Render Risk Assessment In Note?: no
Comment: Possibly due to underlying autoimmune disease and had been anemic with her past labs.
Allergy;

## 2022-11-22 ENCOUNTER — APPOINTMENT (OUTPATIENT)
Dept: OPHTHALMOLOGY | Facility: CLINIC | Age: 47
End: 2022-11-22

## 2022-11-22 ENCOUNTER — NON-APPOINTMENT (OUTPATIENT)
Age: 47
End: 2022-11-22

## 2022-11-22 PROCEDURE — 92004 COMPRE OPH EXAM NEW PT 1/>: CPT

## 2022-11-22 PROCEDURE — 92015 DETERMINE REFRACTIVE STATE: CPT | Mod: NC

## 2022-12-28 NOTE — ED BEHAVIORAL HEALTH ASSESSMENT NOTE - NS ED BHA MED ROS SKIN
No complaints Skyrizi Counseling: I discussed with the patient the risks of risankizumab-rzaa including but not limited to immunosuppression, and serious infections.  The patient understands that monitoring is required including a PPD at baseline and must alert us or the primary physician if symptoms of infection or other concerning signs are noted.

## 2023-01-17 NOTE — ED PROVIDER NOTE - OBJECTIVE STATEMENT
2nd attempt, patient called after not checking in for today's virtual visit.  Voicemail with contact information given.     42f, homeless, chronic psych disease, multiple suicide attempts. Comes to ED because she is requesting "a home forever" and that the shelters "Always kick me out". She says she just wants to sleep. she denies si/hi, drugs, etoh, auditory or visual hallucinations.   Denies any new medical symptoms. Specifically, denies f/c, weight loss, change in skin or hair, intolerance to hot or cold. Denies any nre polyuria or polydipsia. Also denies any new headache, stiff neck, weakness, numbness, parasthesias, change in vision, ataxia, nausea or vomiting. Denies any recent drug use.    she is requesting to see SW

## 2023-02-13 NOTE — ED PROVIDER NOTE - NS ED MD EM SELECTION
Epidural catheter Procedure Note    Pre-Procedure   Staff -        Performed By: Anesthesiologist  Procedure Documentation  Procedure: epidural catheter   Comments:  Pre-Procedure  Performed by Sunny Goncalves MD  Location: OB.      PreAnesthestic Checklist: patient identified, IV checked, risks and benefits discussed, informed consent obtained, monitors and equipment checked, pre-op evaluation and at physician/surgeon's request.    Timeout   Correct Patient: Yes  Correct Procedure: Epidural catheter placement  Correct Site: Yes   Correct Position: Yes    Procedure Documentation  Procedure:   Epidural catheter block for Labor    Patient currently in labor and she and OBMD request a labor epidural to control her labor pains. Patient was interviewed and examined. Procedure and risks including but not limited to bleeding, infection, nerve injury, paralysis, PDPH, and inadequate block requiring intervention discussed with patient. Questions answered. This epidural is to be placed in anticipation of vaginal delivery.  She consents to the epidural procedure.  Time-out was performed.  I or my partners remain immediately available for management of any issues or complications and will monitor at appropriate intervals.  Procedure: Patient sitting. Betadine prep x 3. Sterile drape applied.  Mask and gloves used.  Lidocaine 1%  local infiltration at L 3-4.  17 G. Tuohy needle at L3-4 by loss of resistance into epidural space.  No CSF, paresthesia or blood. 1.5 % Lidocaine with 1:200,000 Epinephrine 5cc test dose. Epidural catheter inserted w/o resistance to 5 cm in epidural space. Then 0.125% bupivicaine with fentanyl 2 mcg/ml 12 cc with NS 5 cc.  Aspiration negative for blood and CSF.   Negative for neuro change, paresthesia or symptoms of intravascular injection or intrathecal injection.  Infusion orders written and infusion of 0.125% bupivicaine with fentanyl 2 mcg/ml at 10cc per hour started.    Sunny Goncalves MD           FOR  "[facility] ONLY:   Pain Team Contact information: please page the Pain Team Via [email or web address]. Search \"Pain\". During daytime hours, please page the attending first. At night please page the resident first.    " [id number] Exp Problem Focused - Mod. Complex

## 2023-07-13 NOTE — ED ADULT NURSE NOTE - AS SC BRADEN FRICTION
The left coronary artery was selectively engaged and injected. Multiple views of the injected vessel were taken. (3) no apparent problem

## 2023-07-19 NOTE — ED ADULT NURSE NOTE - VISUAL DISTURBANCES
Vitals: Temp: 98.8  F (37.1  C) Temp src: Oral BP: (!) 145/80 Pulse: 71   Resp: 20 SpO2: 95 % O2 Device: None (Room air) Oxygen Delivery: 1 LPM   [id number]   Neuro: A&O x4, CMS intact   Cardiac: WDL  Respiratory: Unable to wean from 1 L to RA. Pt desatted <88% on RA while asleep. LS diminished. Denies SOB.  GI/: Voiding spontaneously via BS commode. +BS, passing flatus, no BM during shift    Activity: Assist x1, walked in rhoades x1   Diet/Nausea: Regular diet, tolerating. Denies nausea  Pain: managed with oxycodone x2  scheduled tylenol   Skin/Drains: L chest to WS. L chest wound vac to  75 suction.  Lines: PIV x2, SL.  Labs: Reviewed.  Plan: Continue with POC   0=not present

## 2023-08-10 NOTE — ED BEHAVIORAL HEALTH ASSESSMENT NOTE - KNOWN PSYCHIATRIC ADMISSION WITHIN THE PAST 30 DAYS
Detail Level: Detailed
Quality 431: Preventive Care And Screening: Unhealthy Alcohol Use - Screening: Patient not identified as an unhealthy alcohol user when screened for unhealthy alcohol use using a systematic screening method
Quality 226: Preventive Care And Screening: Tobacco Use: Screening And Cessation Intervention: Patient screened for tobacco use and is an ex/non-smoker
Quality 130: Documentation Of Current Medications In The Medical Record: Current Medications Documented
No

## 2023-10-09 NOTE — ED ADULT NURSE REASSESSMENT NOTE - GENERAL PATIENT STATE
Refill request for    omeprazole (PRILOSEC) 40 MG delayed release capsule [[id number]]     Order Details  Dose: 40 mg Route: Oral Frequency: DAILY   Dispense Quantity: 90 capsule Refills: 3          Sig: Take 1 capsule by mouth daily         Start Date: 09/19/22 End Date: --   Written Date: 09/19/22 Expiration Date: 09/19/23   Original Order:  omeprazole (PRILOSEC) 40 MG delayed release capsule [[id number]]   Providers    Authorizing Provider: Hugo Casas MD NPI: [id number]   Ordering User:  Hugo Casas MD        Last ov: 6/20/2023  Last labs: 5/03/2023  Next ov: Not scheduled    Thank you
comfortable appearance
cooperative

## 2023-10-12 NOTE — ED ADULT TRIAGE NOTE - STATUS:
Transportation here to  patient via stretcher to be transported to [facility]. Patient belongings were taken by family earlier in the day. Patients shoes and a snack taken with transportation. Paperwork given, report given to transportation. Applied

## 2024-02-05 ENCOUNTER — EMERGENCY (EMERGENCY)
Facility: HOSPITAL | Age: 49
LOS: 1 days | Discharge: HOME | End: 2024-02-05
Attending: EMERGENCY MEDICINE
Payer: MEDICAID

## 2024-02-05 VITALS
HEART RATE: 68 BPM | WEIGHT: 179.9 LBS | RESPIRATION RATE: 20 BRPM | OXYGEN SATURATION: 98 % | HEIGHT: 67 IN | TEMPERATURE: 98 F | SYSTOLIC BLOOD PRESSURE: 125 MMHG | DIASTOLIC BLOOD PRESSURE: 85 MMHG

## 2024-02-05 VITALS
RESPIRATION RATE: 18 BRPM | SYSTOLIC BLOOD PRESSURE: 113 MMHG | OXYGEN SATURATION: 97 % | DIASTOLIC BLOOD PRESSURE: 70 MMHG | TEMPERATURE: 98 F | HEART RATE: 100 BPM

## 2024-02-05 DIAGNOSIS — F25.9 SCHIZOAFFECTIVE DISORDER, UNSPECIFIED: ICD-10-CM

## 2024-02-05 LAB
ALBUMIN SERPL ELPH-MCNC: 3.7 G/DL — SIGNIFICANT CHANGE UP (ref 3.3–5.2)
ALP SERPL-CCNC: 110 U/L — SIGNIFICANT CHANGE UP (ref 40–120)
ALT FLD-CCNC: 17 U/L — SIGNIFICANT CHANGE UP
AMPHET UR-MCNC: NEGATIVE — SIGNIFICANT CHANGE UP
ANION GAP SERPL CALC-SCNC: 15 MMOL/L — SIGNIFICANT CHANGE UP (ref 5–17)
APAP SERPL-MCNC: <3 UG/ML — LOW (ref 10–26)
APPEARANCE UR: CLEAR — SIGNIFICANT CHANGE UP
AST SERPL-CCNC: 17 U/L — SIGNIFICANT CHANGE UP
BARBITURATES UR SCN-MCNC: NEGATIVE — SIGNIFICANT CHANGE UP
BASOPHILS # BLD AUTO: 0.05 K/UL — SIGNIFICANT CHANGE UP (ref 0–0.2)
BASOPHILS NFR BLD AUTO: 0.6 % — SIGNIFICANT CHANGE UP (ref 0–2)
BENZODIAZ UR-MCNC: NEGATIVE — SIGNIFICANT CHANGE UP
BILIRUB SERPL-MCNC: 0.2 MG/DL — LOW (ref 0.4–2)
BILIRUB UR-MCNC: NEGATIVE — SIGNIFICANT CHANGE UP
BUN SERPL-MCNC: 10.1 MG/DL — SIGNIFICANT CHANGE UP (ref 8–20)
CALCIUM SERPL-MCNC: 9.3 MG/DL — SIGNIFICANT CHANGE UP (ref 8.4–10.5)
CHLORIDE SERPL-SCNC: 97 MMOL/L — SIGNIFICANT CHANGE UP (ref 96–108)
CO2 SERPL-SCNC: 22 MMOL/L — SIGNIFICANT CHANGE UP (ref 22–29)
COCAINE METAB.OTHER UR-MCNC: NEGATIVE — SIGNIFICANT CHANGE UP
COLOR SPEC: YELLOW — SIGNIFICANT CHANGE UP
CREAT SERPL-MCNC: 0.75 MG/DL — SIGNIFICANT CHANGE UP (ref 0.5–1.3)
DIFF PNL FLD: NEGATIVE — SIGNIFICANT CHANGE UP
EGFR: 99 ML/MIN/1.73M2 — SIGNIFICANT CHANGE UP
EOSINOPHIL # BLD AUTO: 0.05 K/UL — SIGNIFICANT CHANGE UP (ref 0–0.5)
EOSINOPHIL NFR BLD AUTO: 0.6 % — SIGNIFICANT CHANGE UP (ref 0–6)
ETHANOL SERPL-MCNC: <10 MG/DL — SIGNIFICANT CHANGE UP (ref 0–9)
FLUAV AG NPH QL: SIGNIFICANT CHANGE UP
FLUBV AG NPH QL: SIGNIFICANT CHANGE UP
GLUCOSE SERPL-MCNC: 113 MG/DL — HIGH (ref 70–99)
GLUCOSE UR QL: NEGATIVE MG/DL — SIGNIFICANT CHANGE UP
HCG SERPL-ACNC: <4 MIU/ML — SIGNIFICANT CHANGE UP
HCT VFR BLD CALC: 44.1 % — SIGNIFICANT CHANGE UP (ref 34.5–45)
HGB BLD-MCNC: 14.3 G/DL — SIGNIFICANT CHANGE UP (ref 11.5–15.5)
IMM GRANULOCYTES NFR BLD AUTO: 0.5 % — SIGNIFICANT CHANGE UP (ref 0–0.9)
KETONES UR-MCNC: NEGATIVE MG/DL — SIGNIFICANT CHANGE UP
LEUKOCYTE ESTERASE UR-ACNC: NEGATIVE — SIGNIFICANT CHANGE UP
LYMPHOCYTES # BLD AUTO: 1.52 K/UL — SIGNIFICANT CHANGE UP (ref 1–3.3)
LYMPHOCYTES # BLD AUTO: 17.8 % — SIGNIFICANT CHANGE UP (ref 13–44)
MCHC RBC-ENTMCNC: 30.4 PG — SIGNIFICANT CHANGE UP (ref 27–34)
MCHC RBC-ENTMCNC: 32.4 GM/DL — SIGNIFICANT CHANGE UP (ref 32–36)
MCV RBC AUTO: 93.6 FL — SIGNIFICANT CHANGE UP (ref 80–100)
METHADONE UR-MCNC: NEGATIVE — SIGNIFICANT CHANGE UP
MONOCYTES # BLD AUTO: 0.67 K/UL — SIGNIFICANT CHANGE UP (ref 0–0.9)
MONOCYTES NFR BLD AUTO: 7.8 % — SIGNIFICANT CHANGE UP (ref 2–14)
NEUTROPHILS # BLD AUTO: 6.22 K/UL — SIGNIFICANT CHANGE UP (ref 1.8–7.4)
NEUTROPHILS NFR BLD AUTO: 72.7 % — SIGNIFICANT CHANGE UP (ref 43–77)
NITRITE UR-MCNC: NEGATIVE — SIGNIFICANT CHANGE UP
OPIATES UR-MCNC: NEGATIVE — SIGNIFICANT CHANGE UP
PCP SPEC-MCNC: SIGNIFICANT CHANGE UP
PCP UR-MCNC: NEGATIVE — SIGNIFICANT CHANGE UP
PH UR: 6 — SIGNIFICANT CHANGE UP (ref 5–8)
PLATELET # BLD AUTO: 229 K/UL — SIGNIFICANT CHANGE UP (ref 150–400)
POTASSIUM SERPL-MCNC: 4.2 MMOL/L — SIGNIFICANT CHANGE UP (ref 3.5–5.3)
POTASSIUM SERPL-SCNC: 4.2 MMOL/L — SIGNIFICANT CHANGE UP (ref 3.5–5.3)
PROT SERPL-MCNC: 6.6 G/DL — SIGNIFICANT CHANGE UP (ref 6.6–8.7)
PROT UR-MCNC: NEGATIVE MG/DL — SIGNIFICANT CHANGE UP
RBC # BLD: 4.71 M/UL — SIGNIFICANT CHANGE UP (ref 3.8–5.2)
RBC # FLD: 14 % — SIGNIFICANT CHANGE UP (ref 10.3–14.5)
RSV RNA NPH QL NAA+NON-PROBE: SIGNIFICANT CHANGE UP
SALICYLATES SERPL-MCNC: <0.6 MG/DL — LOW (ref 10–20)
SARS-COV-2 RNA SPEC QL NAA+PROBE: SIGNIFICANT CHANGE UP
SODIUM SERPL-SCNC: 134 MMOL/L — LOW (ref 135–145)
SP GR SPEC: 1.01 — SIGNIFICANT CHANGE UP (ref 1–1.03)
THC UR QL: POSITIVE
TSH SERPL-MCNC: 0.49 UIU/ML — SIGNIFICANT CHANGE UP (ref 0.27–4.2)
UROBILINOGEN FLD QL: 0.2 MG/DL — SIGNIFICANT CHANGE UP (ref 0.2–1)
WBC # BLD: 8.55 K/UL — SIGNIFICANT CHANGE UP (ref 3.8–10.5)
WBC # FLD AUTO: 8.55 K/UL — SIGNIFICANT CHANGE UP (ref 3.8–10.5)

## 2024-02-05 PROCEDURE — 99291 CRITICAL CARE FIRST HOUR: CPT

## 2024-02-05 PROCEDURE — 84443 ASSAY THYROID STIM HORMONE: CPT

## 2024-02-05 PROCEDURE — 85025 COMPLETE CBC W/AUTO DIFF WBC: CPT

## 2024-02-05 PROCEDURE — 93010 ELECTROCARDIOGRAM REPORT: CPT

## 2024-02-05 PROCEDURE — 90792 PSYCH DIAG EVAL W/MED SRVCS: CPT

## 2024-02-05 PROCEDURE — 81003 URINALYSIS AUTO W/O SCOPE: CPT

## 2024-02-05 PROCEDURE — 87637 SARSCOV2&INF A&B&RSV AMP PRB: CPT

## 2024-02-05 PROCEDURE — 84702 CHORIONIC GONADOTROPIN TEST: CPT

## 2024-02-05 PROCEDURE — 93005 ELECTROCARDIOGRAM TRACING: CPT

## 2024-02-05 PROCEDURE — 96372 THER/PROPH/DIAG INJ SC/IM: CPT

## 2024-02-05 PROCEDURE — 36415 COLL VENOUS BLD VENIPUNCTURE: CPT

## 2024-02-05 PROCEDURE — 80053 COMPREHEN METABOLIC PANEL: CPT

## 2024-02-05 PROCEDURE — 80307 DRUG TEST PRSMV CHEM ANLYZR: CPT

## 2024-02-05 RX ORDER — HALOPERIDOL DECANOATE 100 MG/ML
5 INJECTION INTRAMUSCULAR ONCE
Refills: 0 | Status: COMPLETED | OUTPATIENT
Start: 2024-02-05 | End: 2024-02-05

## 2024-02-05 RX ADMIN — Medication 2 MILLIGRAM(S): at 12:38

## 2024-02-05 RX ADMIN — HALOPERIDOL DECANOATE 5 MILLIGRAM(S): 100 INJECTION INTRAMUSCULAR at 12:38

## 2024-02-05 NOTE — ED BEHAVIORAL HEALTH ASSESSMENT NOTE - HPI (INCLUDE ILLNESS QUALITY, SEVERITY, DURATION, TIMING, CONTEXT, MODIFYING FACTORS, ASSOCIATED SIGNS AND SYMPTOMS)
Patient is a 48 year old, female; domiciled with fellow residents at [facility]; single; noncaregiver; unemployed on SSI; past psychiatric history of schizoaffective d/o; multiple psychiatric hospitalizations including [place] x 15 years (2002 - 2017); multiple past suicide attempts; currently under the care of Dr. Peralta with her ACT team; no known history of violence or arrests; active substance abuse (marijuana and crack cocaine); no known history of complicated withdrawal; unknown PMH; brought in by EMS; called by staff at [facility]; presenting with agitation; in the setting of recent substance use.     Despite initially presenting with agitation, pt now presents calm, cooperative, stating she was upset because she was "looking for [her] ." Pt presents with concrete thought process, limited insight but future oriented with no overt signs of psychosis. Pt states she would like to return to her residence so she can attend her day program tomorrow.

## 2024-02-05 NOTE — ED BEHAVIORAL HEALTH ASSESSMENT NOTE - RISK ASSESSMENT
RF: chronic severe mental illness, substance abuse  PF: connected to treatment, domiciled, compliant with medication

## 2024-02-05 NOTE — ED ADULT NURSE NOTE - NSICDXPASTMEDICALHX_GEN_ALL_CORE_FT
PAST MEDICAL HISTORY:  Depression     Non compliance w medication regimen     Schizophrenia     Suicidal behavior Cutting with OD in past

## 2024-02-05 NOTE — ED ADULT NURSE NOTE - CHIEF COMPLAINT QUOTE
BIBA from [facility] home for increased agitation after smoking marihuana. Hx of Schizophrenia and cocaine abuse . Pt yelling and screaming upon arrival " I am going to kill all of you...." Dr. Morales called to the bedside for eval . Pt undressed and placed in the yellow gown

## 2024-02-05 NOTE — CHART NOTE - NSCHARTNOTEFT_GEN_A_CORE
SWNote: pt seen by behavioral NP(Doyle) pt T&R . MAS called (             ) taxi arranged .Reserv#                              . Taxi [facility] will p/u pt ,ETA             at the ED lobby. No other Sw needs reported at this moment. SWNote: pt seen by behavioral NP(Doyle) pt T&R . MAS called ( Ro  ) taxi arranged .Reserv#[phone]  . Taxi [facility] will p/u pt ,ETA 19:00  at the ED lobby. No other SW needs reported at this moment. Pt will be waiting in the Ed lobby.

## 2024-02-05 NOTE — ED PROVIDER NOTE - CLINICAL SUMMARY MEDICAL DECISION MAKING FREE TEXT BOX
ASSESSMENT:   HANANE SALAZAR is a 49yo F who presented with aggressive behaviour in setting of marijuana use. Pt w/ schizoaffective disorder. Aggressive in triage requiring medications. Physical exam benign. EKG w/o acute change. Labs w/o significant findings.     Plan:  evaluation.

## 2024-02-05 NOTE — ED PROVIDER NOTE - CARE PLAN
Principal Discharge DX:	Marijuana intoxication  Secondary Diagnosis:	Schizoaffective disorder, unspecified type   1

## 2024-02-05 NOTE — ED BEHAVIORAL HEALTH ASSESSMENT NOTE - ACCESS TO FIREARM
Pt scheduled for BPP and cervical length for 1:00 arrival time, KW at 2:00 after    Pt states just went to the bathroom now, had urine output  Noticed \"a good amount of blood\" \"like menses\" in toilet  Saw 1 small clot this time, size of blueberry  Blood covered piece of toilet paper with wiping  Currently wearing a pad and only has a few drops of blood on pad  Has always had a lot of vaginal pressure this pregnancy, feels a little bit more pressure than usual since speaking with RN earlier today     +FM  No contractions  No fluid leakage    Pt states will need a work note with appointment this afternoon  Pt aware to call back if symptoms worsen or starts with any new symptoms in meantime    Msg to MD as CARMELO   No

## 2024-02-05 NOTE — ED PROVIDER NOTE - ATTENDING CONTRIBUTION TO CARE
I, Starla Jordan DO, have personally provided 40 minutes of critical care time exclusive of time spent on separately billable procedures. Time includes review of laboratory data, radiology results, discussion with consultants, and monitoring for potential decompensation. Interventions were performed as documented above.     I personally saw the patient with the resident, and completed the key components of the history and physical exam. I then discussed the management plan with the resident.    47 y/o F presents for agitation after smoking marijuana, requiring medication for agitation.    I agree with exam as documented.    Psych assessment when clinically sober.

## 2024-02-05 NOTE — ED BEHAVIORAL HEALTH ASSESSMENT NOTE - SUICIDALITY
Orders for admission, patient is aware of plan and ready to go upstairs. Any questions, please call ED RN Nereida Ortiz at extension [phone].      Patient Covid vaccination status: Unvaccinated     COVID Test Ordered in ED: Rapid SARS-CoV-2 by PCR - pending    COVID Suspicion at Admission: Low clinical suspicion for COVID    Running Infusions:      Mental Status/LOC at time of transport: a&o x4    Other pertinent information:   CIWA score: N/A   NIH score:  N/A No

## 2024-02-05 NOTE — ED ADULT NURSE REASSESSMENT NOTE - NS ED NURSE REASSESS COMMENT FT1
Pt roaming around department yelling and demanding something to eat. Pt states "I need some damn food! Give me some damn food! I haven't eaten in 3 days!" This RN attempted to re-direct pt back to her stretcher and explained to pt that she can't walk around the department and  the nurses' station. Pt yells "I'll fucking kick your ass." Dawson RN attempts to re-direct pt and pt yells "I don't give a fuck. I'll fucking kill you. I'll fucking kill him." ED security called to bedside.

## 2024-02-05 NOTE — ED ADULT NURSE NOTE - OBJECTIVE STATEMENT
Pt to ED from [place] w/ reports of agitation s/p smoking marijuana. Pt states "I don't know." when asked what brought her to the ED. Pt A&Ox4 in NAD @ this time. RR even & unlabored. Pt sitting up in bed, eating lunch tray @ this time. Pt denies SI/HI. Hx Schizophrenia.

## 2024-02-05 NOTE — ED PROVIDER NOTE - PATIENT PORTAL LINK FT
You can access the FollowMyHealth Patient Portal offered by [facility] by registering at the following website: [email or web address]. By joining [facility]’s FollowMyHealth portal, you will also be able to view your health information using other applications (apps) compatible with our system.

## 2024-02-05 NOTE — ED PROVIDER NOTE - OBJECTIVE STATEMENT
HANANE SALAZAR is a 47yo Female with PMH Suicidal behavior, Depression, Schizophrenia who presents via EMS after smoking marijuana. PT verbally aggressive in triage, screaming at staff and making threatening statements. Not redirectable. Not participating in history or physical.

## 2024-02-05 NOTE — ED BEHAVIORAL HEALTH ASSESSMENT NOTE - DESCRIPTION
Domiciled at [facility]; chronic substance abuse none Pt medicated upon arrival for acute agitation.     ICU Vital Signs Last 24 Hrs  T(C): 36.6 (05 Feb 2024 15:40), Max: 36.7 (05 Feb 2024 11:58)  T(F): 97.9 (05 Feb 2024 15:40), Max: 98 (05 Feb 2024 11:58)  HR: 100 (05 Feb 2024 15:40) (68 - 100)  BP: 113/70 (05 Feb 2024 15:40) (113/70 - 125/85)  BP(mean): --  ABP: --  ABP(mean): --  RR: 18 (05 Feb 2024 15:40) (18 - 20)  SpO2: 97% (05 Feb 2024 15:40) (97% - 98%)    O2 Parameters below as of 05 Feb 2024 15:40  Patient On (Oxygen Delivery Method): room air

## 2024-02-05 NOTE — BH SAFETY PLAN - ASK FOR HELP NAME 3
Assessment and Plan:     Problem List Items Addressed This Visit        Endocrine    Diabetic neuropathy associated with type 2 diabetes mellitus ([address])    Type 2 diabetes mellitus with microalbuminuria, without long-term current use of insulin (HCC)    Relevant Orders    Hemoglobin A1C    Albumin / creatinine urine ratio    Hemoglobin A1C       Respiratory    FIOR (obstructive sleep apnea)    Chronic obstructive pulmonary disease, unspecified (HCC)       Cardiovascular and Mediastinum    Hypertension    Relevant Orders    Comprehensive metabolic panel    CBC and differential    PAD (peripheral artery disease) ([address])       Other    Mixed hyperlipidemia    Relevant Orders    Lipid panel    Obesity, morbid ([address])   Other Visit Diagnoses     Microcytic anemia    -  Primary    Relevant Orders    Iron Panel (Includes Ferritin, Iron Sat%, Iron, and TIBC)    Vitamin B12    Reticulocytes    Protein electrophoresis, serum    Folate    Medicare annual wellness visit, subsequent            BMI Counseling: Body mass index is 35.44 kg/m². The BMI is above normal. Nutrition recommendations include decreasing portion sizes, consuming healthier snacks, moderation in carbohydrate intake, reducing intake of saturated and trans fat and reducing intake of cholesterol. Exercise recommendations include exercising 3-5 times per week. No pharmacotherapy was ordered. Rationale for BMI follow-up plan is due to patient being overweight or obese. Depression Screening and Follow-up Plan: Patient was screened for depression during today's encounter. They screened negative with a PHQ-2 score of 0. Preventive health issues were discussed with patient, and age appropriate screening tests were ordered as noted in patient's After Visit Summary. Personalized health advice and appropriate referrals for health education or preventive services given if needed, as noted in patient's After Visit Summary.      History of Present Illness:     Patient presents for a Medicare Wellness Visit    HPI   Patient Care Team:  Yenny Brown MD as PCP - Charlotte Roy MD as PCP - PCP-Luz Marina Cintron MD  T Ramona Hammer MD (General Surgery)     Review of Systems:     Review of Systems     Problem List:     Patient Active Problem List   Diagnosis   • Mixed hyperlipidemia   • Hypertension   • FIOR (obstructive sleep apnea)   • Kessler's esophagus without dysplasia   • Gastroesophageal reflux disease   • Fatty liver   • Eczema   • Degenerative joint disease of hand   • PAD (peripheral artery disease) ([address])   • Chronic obstructive pulmonary disease, unspecified ([address])   • Diabetic neuropathy associated with type 2 diabetes mellitus ([address])   • Type 2 diabetes mellitus with microalbuminuria, without long-term current use of insulin ([address])   • Diaphragmatic hernia   • Mucous retention cyst of salivary gland   • Neuroendocrine tumor   • Obesity, morbid [facility])      Past Medical and Surgical History:     Past Medical History:   Diagnosis Date   • Accelerated essential hypertension     LAST ASSESSED 8/27/15   • Benign positional vertigo 8/5/2016   • Cataract, left eye 10/24/2017   • Diverticulosis    • Lumbar radiculopathy 12/7/2016   • Polyp of colon 6/5/2017    Overview:  Added automatically from request for surgery [id number]   • Prostatitis     LAST ASSESSED 3/17/15     Past Surgical History:   Procedure Laterality Date   • CARDIOVASCULAR STRESS TEST        Family History:     Family History   Problem Relation Age of Onset   • Emphysema Father         LUNG   • Stroke Father       Social History:     Social History     Socioeconomic History   • Marital status: /Civil Union     Spouse name: None   • Number of children: None   • Years of education: None   • Highest education level: None   Occupational History   • None   Tobacco Use   • Smoking status: Former     Packs/day: 2.00     Years: 30.00     Total pack years: 60.00     Types: Cigarettes     Quit date:      Years since quittin.5   • Smokeless tobacco: Never   Substance and Sexual Activity   • Alcohol use: No   • Drug use: No   • Sexual activity: None   Other Topics Concern   • None   Social History Narrative   • None     Social Determinants of Health     Financial Resource Strain: Low Risk  (2023)    Overall Financial Resource Strain (CARDIA)    • Difficulty of Paying Living Expenses: Not very hard   Food Insecurity: Not on file   Transportation Needs: No Transportation Needs (2023)    PRAPARE - Transportation    • Lack of Transportation (Medical): No    • Lack of Transportation (Non-Medical):  No   Physical Activity: Not on file   Stress: Not on file   Social Connections: Not on file   Intimate Partner Violence: Not on file   Housing Stability: Not on file      Medications and Allergies:     Current Outpatient Medications   Medication Sig Dispense Refill   • amLODIPine (NORVASC) 10 mg tablet Take 1 tablet (10 mg total) by mouth daily 90 tablet 3   • aspirin (ECOTRIN LOW STRENGTH) 81 mg EC tablet Take 81 mg by mouth daily     • atorvastatin (LIPITOR) 20 mg tablet Take 1 tablet (20 mg total) by mouth daily 90 tablet 3   • carvedilol (COREG) 25 mg tablet Take 1 tablet (25 mg total) by mouth 2 (two) times a day 180 tablet 3   • chlorthalidone 25 mg tablet Take 1.5 tablets (37.5 mg total) by mouth daily 135 tablet 3   • Cholecalciferol (VITAMIN D3) 1000 UNIT/SPRAY LIQD Take by mouth     • Cyanocobalamin (VITAMIN B-12) 1000 MCG/15ML LIQD Take 1 tablet by mouth daily     • Fluticasone-Salmeterol (Advair Diskus) 250-50 mcg/dose inhaler Inhale 1 puff 2 (two) times a day 180 blister 3   • Folic Acid 20 MG CAPS Take 1 capsule by mouth daily     • metFORMIN (GLUCOPHAGE) 850 mg tablet Take 1 tablet (850 mg total) by mouth 2 (two) times a day with meals 180 tablet 3   • Omega-3 Fatty Acids (FISH OIL) 1,000 mg Take 1 capsule by mouth daily     • omeprazole (PriLOSEC) 40 MG capsule Take 1 capsule by mouth 2 (two) times a day     • polyethylene glycol-propylene glycol (SYSTANE) 0.4-0.3 % Apply to eye     • triamcinolone (KENALOG) 0.1 % cream Apply topically 2 (two) times a day 80 g 3   • valsartan (DIOVAN) 320 MG tablet Take 1 tablet (320 mg total) by mouth daily 90 tablet 3     No current facility-administered medications for this visit. No Known Allergies   Immunizations:     Immunization History   Administered Date(s) Administered   • COVID-19 PFIZER VACCINE 0.3 ML IM 02/19/2021, 03/11/2021, 10/19/2021   • COVID-19 Pfizer Vac BIVALENT Roger-sucrose 12 Yr+ IM (BOOSTER ONLY) 10/08/2022   • H1N1, All Formulations 02/09/2010   • INFLUENZA 11/05/2019   • Influenza Split High Dose Preservative Free IM 09/23/2013, 10/01/2014, 11/15/2016, 10/24/2017, 11/05/2019   • Influenza, high dose seasonal 0.7 mL 11/19/2018, 11/05/2019, 08/24/2020, 09/22/2021   • Influenza, seasonal, injectable 09/06/2011, 09/11/2012   • Pneumococcal Conjugate 13-Valent 10/12/2015   • Pneumococcal Polysaccharide PPV23 09/06/2011, 11/15/2016      Health Maintenance:         Topic Date Due   • Hepatitis C Screening  Addressed   • Colorectal Cancer Screening  Discontinued         Topic Date Due   • Influenza Vaccine (1) 09/01/2023      Medicare Screening Tests and Risk Assessments:       No results found.      Physical Exam:     /68 (BP Location: Left arm, Patient Position: Sitting, Cuff Size: Large)   Pulse (!) 50   Temp 97.7 °F (36.5 °C)   Ht 5' 8.5" (1.74 m)   Wt 107 kg (236 lb 8 oz)   SpO2 97%   BMI 35.44 kg/m²     Physical Exam     Aneta Mcgee MD [facility]

## 2024-02-05 NOTE — ED BEHAVIORAL HEALTH ASSESSMENT NOTE - NSBHATTESTAPPBILLTIME_PSY_A_CORE
I attest my time as RAYMOND is greater than 50% of the total combined time spent on qualifying patient care activities. I have reviewed and verified the documentation.

## 2024-02-05 NOTE — ED ADULT TRIAGE NOTE - CHIEF COMPLAINT QUOTE
BIBA from [facility] home for increased agitation after smoking marihuana. Hx of Schizophrenia and cocaine abuse . Pt yelling and screaming upon arrival " I am going to kill all of you...." Dr. Morales called to the bedside for eval . Pt undressed and placed in the yellow gown

## 2024-02-05 NOTE — ED ADULT NURSE NOTE - NSFALLUNIVINTERV_ED_ALL_ED
Bed/Stretcher in lowest position, wheels locked, appropriate side rails in place/Call bell, personal items and telephone in reach/Instruct patient to call for assistance before getting out of bed/chair/stretcher/Non-slip footwear applied when patient is off stretcher/[place] to call system/Physically safe environment - no spills, clutter or unnecessary equipment/Purposeful proactive rounding/Room/bathroom lighting operational, light cord in reach

## 2024-02-05 NOTE — ED BEHAVIORAL HEALTH ASSESSMENT NOTE - SUMMARY
48 year old, female; domiciled with fellow residents at [facility]; single; noncaregiver; unemployed on SSI; past psychiatric history of schizoaffective d/o; multiple psychiatric hospitalizations including [place] x 15 years (2002 - 2017); multiple past suicide attempts; currently under the care of Dr. Peralta with her ACT team; no known history of violence or arrests; active substance abuse (marijuana and crack cocaine); no known history of complicated withdrawal; unknown PMH; brought in by EMS; called by staff at [facility]; presenting with agitation; in the setting of recent substance use.     Upon assessment pt presents in behavioral control with no overt signs of psychosis or acute mood disturbance; recent agitation likely the result of substance use. Pt is now able to articulate several coping strategies and denies any current SI/HI/AH/VH/paranoia. No acute safety concerns expressed by staff at residence. Pt does not warrant further psychiatric observation and is psychiatrically cleared for discharge and will follow up with her ACT team. Case discussed with Dr. Downing who is in agreement with assessment and plan.

## 2024-02-09 NOTE — ED BEHAVIORAL HEALTH ASSESSMENT NOTE - REMOTE MEMORY
[No Acute Distress] : no acute distress [Normal Oropharynx] : normal oropharynx [I] : Mallampati Class: I [Normal Appearance] : normal appearance [Supple] : supple [No Neck Mass] : no neck mass [No JVD] : no jvd [Normal Rate/Rhythm] : normal rate/rhythm [Normal S1, S2] : normal s1, s2 [No Murmurs] : no murmurs [No Rubs] : no rubs [No Gallops] : no gallops [No Resp Distress] : no resp distress [No Acc Muscle Use] : no acc muscle use [Normal Palpation] : normal palpation [Normal Rhythm and Effort] : normal rhythm and effort [Clear to Auscultation Bilaterally] : clear to auscultation bilaterally [Normal to Percussion] : normal to percussion [No Abnormalities] : no abnormalities [Benign] : benign [Not Tender] : not tender [Soft] : soft [No HSM] : no hsm [Normal Bowel Sounds] : normal bowel sounds [Normal Gait] : normal gait [No Clubbing] : no clubbing [No Cyanosis] : no cyanosis [No Edema] : no edema [Normal Color/ Pigmentation] : normal color/ pigmentation [No Focal Deficits] : no focal deficits [Oriented x3] : oriented x3 [Normal Affect] : normal affect Normal

## 2024-02-13 NOTE — ED ADULT NURSE NOTE - GASTROINTESTINAL WDL
Otc Regimen: I recommended a broad spectrum sunscreen with a SPF of 30 or higher. I explained that SPF 30 sunscreens block approximately 97 percent of the sun's harmful rays. Sunscreens should be applied at least 15 minutes prior to expected sun exposure and then every 2 hours after that as long as sun exposure continues. If swimming or exercising sunscreen should be reapplied every 45 minutes to an hour after getting wet or sweating. I also recommended a lip balm with a sunscreen as well. Sun protective clothing can be used in lieu of sunscreen but must be worn the entire time you are exposed to the sun's rays.
Detail Level: Generalized
Abdomen soft, nontender, nondistended, bowel sounds present in all 4 quadrants.

## 2024-04-04 NOTE — ED ADULT TRIAGE NOTE - NS ED NURSE BANDS TYPE
Subjective:     CHIEF COMPLAINT  Chief Complaint   Patient presents with    Cough     Chest tightness from coughing, hard time getting a deep breath, chest congestion, here visiting and forgot her inhaler at home x 1 week       HPI  Tricia Mendez is a very pleasant 68 y.o. female who presents with a cough, shortness of breath, and wheezing.  She has a history of asthma that worsens with seasonal allergies.  She reports that she just arrived to [place] to visit her son and forgot her albuterol inhaler at home.  She is requesting a refill of this medication.  She reports that she does take Montelukast, Zyrtec, Mucinex, and Sudafed as needed for allergies and congestion.  She has not had any fevers.  She rates her cough has been productive.    REVIEW OF SYSTEMS  Review of Systems   Constitutional:  Negative for fever.   Respiratory:  Positive for cough, sputum production, shortness of breath and wheezing.    Cardiovascular:  Negative for chest pain.       PAST MEDICAL HISTORY  Patient Active Problem List    Diagnosis Date Noted    Osteoporosis without current pathological fracture 09/26/2019    Obesity (BMI 30-39.9) 08/19/2019    Elevated blood pressure reading 08/19/2019    Eczema     Postmenopausal osteoporosis 10/31/2016    OSTEOPOROSIS 04/26/2012    Carcinoma of breast (HCC) 04/26/2012       SURGICAL HISTORY   has a past surgical history that includes lumpectomy and primary c section.    ALLERGIES  No Known Allergies    CURRENT MEDICATIONS  Home Medications       Reviewed by Annika Jose P.A.-C. (Physician Assistant) on 04/03/24 at 1720  Med List Status: <None>     Medication Last Dose Status   albuterol 108 (90 Base) MCG/ACT Aero Soln inhalation aerosol Taking Active   alendronate (FOSAMAX) 70 MG Tab Taking Active   benzonatate (TESSALON) 100 MG Cap Taking Active   cetirizine (ZYRTEC) 10 MG Tab Taking Active   COVID-19 mRNA Vac-[place],Pfizer, (PFIZER-BIONT COVID-19 VAC-EARLE) 30 MCG/0.3ML Suspension  "injection Taking Active   Fexofenadine HCl (MUCINEX ALLERGY PO) Taking Active   montelukast (SINGULAIR) 10 MG Tab Taking Active   Pseudoephedrine-DM-GG (SUDAFED COUGH PO) Taking Active                    SOCIAL HISTORY  Social History     Tobacco Use    Smoking status: Former     Current packs/day: 0.00     Types: Cigarettes     Quit date: 2002     Years since quittin.9    Smokeless tobacco: Never   Vaping Use    Vaping Use: Never used   Substance and Sexual Activity    Alcohol use: No    Drug use: No    Sexual activity: Not Currently       FAMILY HISTORY  Family History   Problem Relation Age of Onset    Cancer Mother         Lymphoma    Heart Disease Father     Cancer Sister         Skin    Diabetes Maternal Grandmother     Diabetes Maternal Grandfather     Stroke Neg Hx           Objective:     VITAL SIGNS: BP (!) 144/80 (BP Location: Left arm, Patient Position: Sitting, BP Cuff Size: Adult)   Pulse 98   Temp 36.7 °C (98 °F) (Temporal)   Resp 16   Ht 1.626 m (5' 4\")   Wt 86.2 kg (190 lb)   LMP  (LMP Unknown)   SpO2 93%   BMI 32.61 kg/m²     PHYSICAL EXAM  Physical Exam  Vitals reviewed.   Constitutional:       General: She is not in acute distress.     Appearance: Normal appearance. She is not ill-appearing, toxic-appearing or diaphoretic.   HENT:      Head: Normocephalic and atraumatic.   Eyes:      Conjunctiva/sclera: Conjunctivae normal.   Cardiovascular:      Rate and Rhythm: Normal rate and regular rhythm.      Heart sounds: Normal heart sounds.   Pulmonary:      Effort: Pulmonary effort is normal. No respiratory distress.      Breath sounds: No stridor. Wheezing present. No rhonchi or rales.      Comments: Wheezing present in bilateral upper and lower lung fields.  No rales or rhonchi.  Skin:     General: Skin is warm and dry.      Coloration: Skin is not pale.   Neurological:      General: No focal deficit present.      Mental Status: She is alert.   Psychiatric:         Mood and Affect: " Mood normal.       Assessment/Plan:     1. Elevated blood pressure reading    2. Exacerbation of asthma, unspecified asthma severity, unspecified whether persistent  - fluticasone (FLONASE) 50 MCG/ACT nasal spray; Administer 2 Sprays into affected nostril(S) every day.  Dispense: 16 g; Refill: 0  - ipratropium-albuterol (DUONEB) nebulizer solution  - methylPREDNISolone (MEDROL DOSEPAK) 4 MG Tablet Therapy Pack; Follow schedule on package instructions.  Dispense: 21 Tablet; Refill: 0    3. Medication refill  - albuterol 108 (90 Base) MCG/ACT Aero Soln inhalation aerosol; Inhale 2 Puffs every 6 hours as needed for Shortness of Breath.  Dispense: 8.5 g; Refill: 0    Other orders  - benzonatate (TESSALON) 100 MG Cap; TAKE 1 TO 2 CAPSULES BY MOUTH 3 TIMES A DAY AS NEEDED.  - montelukast (SINGULAIR) 10 MG Tab; Take 10 mg by mouth every evening.  -Follow-up with primary care provider regarding elevated blood pressure reading  -Follow-up with primary care provider regarding further asthma management  -If shortness of breath worsens, be seen in emergency department  -Return to clinic as needed    MDM/Comments:  Patient has stable vital signs and is non-toxic appearing.  Patient has a history of asthma and seems to be experiencing an asthma exacerbation secondary to allergies and discontinuation of her albuterol inhaler.  Patient presented to the clinic with a pulse oxygen of 93% on room air.  She was provided a DuoNeb breathing treatment with a pulse oxygen reading of 93% percent, although patient did report improvement in shortness of breath and wheezing did improve upon auscultation of the lungs.  She has been provided a refill of her albuterol as well as a prescription for Flonase and a Medrol Dosepak.  Discussed following up with her primary care provider for further asthma management.  Additionally, discussed with the patient that she did have an elevated blood pressure reading in the office today.  The patient feels  that this is related to waking up at 2 AM for a flight as well as feeling stressed.  Patient demonstrated understanding of treatment plan at this time and will RTC if symptoms worsen or fail to resolve.     Differential diagnosis, natural history, supportive care, and indications for immediate follow-up discussed. All questions answered. Patient agrees with the plan of care.    Follow-up as needed if symptoms worsen or fail to improve to PCP, Urgent care or Emergency Room.    I have personally reviewed prior external notes and test results pertinent to today's visit.  I have independently reviewed and interpreted all diagnostics ordered during this urgent care acute visit.   Discussed management options (risks,benefits, and alternatives to treatment). Pt expresses understanding and the treatment plan was agreed upon. Questions were encouraged and answered to pt's satisfaction.    Please note that this dictation was created using voice recognition software. I have made a reasonable attempt to correct obvious errors, but I expect that there are errors of grammar and possibly content that I did not discover before finalizing the note.   Name band;

## 2024-05-24 NOTE — ED ADULT NURSE NOTE - NSABSCREENINGEMOSIGNS_ED_A_ED
You are having painful spasms of your urethra and bladder related to recent surgery and Daley catheter.  ER workup in the emergency department showed no concerning findings.  We are prescribing medications to help control your pain and spasms.  Return immediately to the Emergency Department for worsening pain, fever, vomiting, drainage from the Daley catheter, blood in the catheter bag, if urine stops draining through the catheter, or any other concerns.   depression/suicidal ideation or suicide attempts

## 2024-06-04 NOTE — ED ADULT NURSE NOTE - NSSISCREENINGQ4_ED_A_ED
MEDICARE WELLNESS VISIT NOTE    HISTORY OF PRESENT ILLNESS:   Parker presents for his Welcome to Medicare Medicare Wellness Visit.   He has no current complaints or concerns.      Patient Care Team:  Steven Pearson DO as PCP - General (Family Practice)        Patient Active Problem List   Diagnosis    CERVICALGIA PT    Anxiety    Hypothyroidism    History of melanoma    Burn of abdomen    Malignant neoplasm of prostate  (CMD)    Dehydration    CINV (chemotherapy-induced nausea and vomiting)    Smoker    Uses hearing aid         Past Medical History:   Diagnosis Date    Ankle fracture 01/13/2021    right ankle: from fall on ice; requiring surgery    Anxiety Disorder     Benign prostatic hyperplasia with post-void dribbling 05/22/2019    BPH (benign prostatic hyperplasia)     LUTS  as of 04/2023    Colon polyp 10/30/2020    dr sweet repeat 1yr d/t poor prep, tubular adenomas. Also 12/2021 Polyps noted    Depression     Diverticulosis of colon 10/30/2020    dr sweet    ED (erectile dysfunction)     situational during divorce    Elevated PSA 08/11/2023    Essential (primary) hypertension     Fall from slipping on ice 01/13/2021    fractured right ankle; surgery required    High cholesterol     Hypothyroidism     Internal hemorrhoids 10/2020    Malignant neoplasm of prostate  (CMD) 08/11/2023    Melanoma  (CMD) 10/2020    Melanoma in situ of back, excised on 11/12/2020    Personal Hx Tobacco Use     Sensorineural hearing loss 04/2023    Sleep apnea     borderline sleep apnea; MD felt pt does not need to use cpap    Wears glasses          Past Surgical History:   Procedure Laterality Date    Colonoscopy diagnostic  09/17/2010    Dr. Renteria-Normal    Colonoscopy w/ polypectomy  10/30/2020    dr sweet repeat 1 yr d/t poor prep/avoid meat day before    Colonoscopy w/ polypectomy  12/22/2021    Fracture surgery  01/26/2021    ORIF Right ankle    Mass excision  11/12/2020    Melanoma removed from back    Myocardial perfusion  rest or stress mult st  11/04/2021    Transurethral resection of prostate  08/12/2008    Bladder diverticulum noted         Social History     Tobacco Use    Smoking status: Some Days     Types: Cigars    Smokeless tobacco: Never    Tobacco comments:     cigars 3-4 times a week in the summer   Vaping Use    Vaping status: never used   Substance Use Topics    Alcohol use: Yes     Alcohol/week: 10.0 - 14.0 standard drinks of alcohol     Types: 10 - 14 Cans of beer per week     Comment: 1-2 daily    Drug use: Never     Drug use:    Drug Use:    Never           Family History   Problem Relation Age of Onset    Other Mother         Alzheimers    Cancer Father         prostate    Cancer Brother         prostate       Current Outpatient Medications   Medication Sig Dispense Refill    ALPRAZolam (XANAX) 0.5 MG tablet Take 0.5-1 tablets by mouth daily as needed for Anxiety. 10 tablet 0    Multiple Vitamin (Multivitamin Adult) Tab Take 1 tablet by mouth in the morning and 1 tablet in the evening. Take with meals. 180 tablet 3    levothyroxine 100 MCG tablet Take 1 tablet by mouth daily. 90 tablet 3    lisinopril (ZESTRIL) 5 MG tablet TAKE 1 TABLET BY MOUTH EVERY DAY 90 tablet 0    traZODone (DESYREL) 100 MG tablet TAKE 1 TABLET BY MOUTH EVERY DAY AT NIGHT 90 tablet 0    abiraterone acetate (ZYTIGA) 250 MG Tab Take 4 tablets by mouth daily. 120 tablet 11    predniSONE (DELTASONE) 5 MG tablet Take 1 tablet by mouth daily. 30 tablet 11    pravastatin (PRAVACHOL) 20 MG tablet TAKE 1 TABLET BY MOUTH EVERY DAY 90 tablet 1    ibuprofen (MOTRIN) 200 MG tablet Take 400 mg by mouth every 6 hours as needed for Pain.       No current facility-administered medications for this visit.     Facility-Administered Medications Ordered in Other Visits   Medication Dose Route Frequency Provider Last Rate Last Admin    sodium chloride (PF) 0.9 % injection 20 mL  20 mL Injection PRN Schoenleber, Stephanie L, MD   20 mL at 08/25/23 1303        The  following items on the Medicare Health Risk Assessment were found to be positive  6 a.) How many servings of Fruits and Vegetables do you have each day ( 1 serving = 1 piece of fruit, 1/2 cup fruits or vegetables): 1 per day     6 b.) How many servings of High Fiber / Whole Grain Foods to you have each day ( 1 serving = 1 cup cold cereal, 1/2 cup cooked cereal, 1 slice bread): 1 per day         Vision and Hearing screens: Not performed    Advance care planning documents on file - yes     Cognitive/Functional Status: no evidence of cognitive dysfunction by direct observation    Opioid Review: Robert is not taking opioid medications.    Recent PHQ 2/9 Score:    PHQ 2:  PHQ 2 Score Adult PHQ 2 Score Adult PHQ 2 Interpretation Little interest or pleasure in activity?   5/28/2024  11:30 AM 0 No further screening needed 0       PHQ 9:  PHQ 9 Score Adult PHQ 9 Score Adult PHQ 9 Interpretation   4/29/2021   8:31 AM 4 Minimal Depression       DEPRESSION ASSESSMENT/PLAN:  Depression screening is negative no further plan needed.     Stress is low now since retired.     Body mass index is 35.6 kg/m².    BMI FOLLOW-UP/PLAN:  Patient is obese.    Lifestyle handout       Needed Screening/Treatment:   None  Needed follow up:  None    See orders.   See Patient Instructions section.   Return in about 1 year (around 6/4/2025) for Medicare Wellness Visit seeing for thyroid and kidney function.     Steven Pearson DO (TJ)  Family Medicine  6/4/2024     No

## 2024-06-11 NOTE — ED BEHAVIORAL HEALTH ASSESSMENT NOTE - ABUSE / TRAUMA HISTORY
CHIEF COMPLAINT:  Recurrent right leg pain  2-3m postop f/u    INTERVAL HISTORY (6/7/24):  Patient returns with his sister.  He is now 2-3 months status post diskectomy.  He continues to report significant right leg pain that is radicular in nature.  There has been no significant changes since his last visit.  He continues to work light duty and finds that this pain continues to inhibit his ability to carry out his daily activities and complete his work duties.  He remains reluctant to undergo more surgery.  He does not endorse any numbness or weakness.    INTERVAL HISTORY (5/22/24):  He is now 2m s/p right MIS L5-S1 discectomy for large HNP on 3/11/24.  He presents with severe right leg pain that radiates from his buttock down to his ankle.  The only difference compared to preop as it does not radiate into his toes.  He states that he had good pain relief following surgery for 1 day and then the pain returned.  Pain is triggered with most movements in particular bending.  He was recently seen by my PA who prescribed a steroid pack that did not provide any sustained relief.  He has been doing physical therapy but in limited fashion due to the pain.  He denies any numbness but has some the pain related weakness because he favors the leg.  He had returned to work on a light duty basis but is limited due to the pain.    HPI:  Eleazar Casillas is a 39 y.o.  male with below listed PMH, who is referred for evaluation of acute right leg pain 2/2 large L5-S1 herniated disc.  Pain started approximately 1 month ago but was much less severe.  The pain resided from his buttock down the back of leg to his knee.  He then changed out an ACE unit for his work and the pain progressed to his lower back and then progressed to severe pain down his leg to his foot.  The pain is severe and shooting.  He has periods where back of his leg and calf are n/a constant Charley horse.  He is unable to straighten his leg.  He has pain with bowel  movements and is limited in his mobility.  He has continued to work but has to be careful of his movements.  He has right leg weakness.  MRI shows large L5-S1 HNP compressing S1 nerve root.    FROM PA NOTE:  History of Present Illness: Eleazar Casillas is a 39 y.o. male with hx of C5-6 ACDF with Dr. Morris in 2019. The patient is being seen in clinic today to follow-up on his recent ED visit from 2/12/24 for acute low back and right leg pain. Denies trauma. States that his job requires heavy lifting but he is uncertain the cause of the pain. Describes the pain as constant and aching down the posterior aspect of the leg. Denies left leg symptoms. Aggravating factors include standing, walking, and bending. Alleviating factors include rest. Denies weakness, b/b dysfunction, saddle anesthesia, or gait instability. Patient has tried Medrol dose gerard, Flexeril, Mobic, Lidoderm patches, and Oxycodone for pain with mild relief.    Review of patient's allergies indicates:  No Known Allergies    Past Medical History:   Diagnosis Date    Cervical herniated disc 9/20/2019    Left cervical radiculopathy 9/21/2019    Numbness and tingling in left hand 9/21/2019    Radiculopathy of cervical spine 9/21/2019     Past Surgical History:   Procedure Laterality Date    ANTERIOR CERVICAL DISCECTOMY W/ FUSION  9/21/2019    Procedure: DISCECTOMY, SPINE, CERVICAL, ANTERIOR APPROACH, WITH FUSION, C5-C6;  Surgeon: Jeff Morris DO;  Location: [facility] OR [address] [place] [address];  Service: Neurosurgery;;    LUMBAR LAMINECTOMY WITH DISCECTOMY Right 3/11/2024    Procedure: LAMINECTOMY, SPINE, LUMBAR, WITH DISCECTOMY;  Surgeon: Jeff Morris DO;  Location: [facility] OR [address] [place] [address];  Service: Neurosurgery;  Laterality: Right;     No family history on file.  Social History     Tobacco Use    Smoking status: Every Day     Current packs/day: 1.00     Types: Cigarettes    Smokeless tobacco: Never   Substance Use Topics    Alcohol use: Not Currently    Drug use: Not Currently      Comment: suboxoe intermittently         Review of Systems   Constitutional: Negative.    Respiratory:  Negative for cough and shortness of breath.    Cardiovascular:  Negative for chest pain, palpitations, claudication and leg swelling.   Gastrointestinal:  Negative for abdominal pain, constipation and diarrhea.   Genitourinary:  Negative for flank pain, frequency and urgency.   Musculoskeletal:  Negative for back pain, falls, joint pain and neck pain.   Skin: Negative.    Neurological:  Negative for dizziness, tingling, tremors, sensory change, speech change, focal weakness, seizures, loss of consciousness, weakness and headaches.   Psychiatric/Behavioral: Negative.         OBJECTIVE:   Vital Signs:  Pulse: 83 (06/07/24 1407)  BP: 113/68 (06/07/24 1407)    Physical Exam:  Constitutional: Patient sitting comfortably in chair. Appears well developed and well nourished.  Skin: Exposed areas are intact without abnormal markings, rashes or other lesions.  HEENT: Normocephalic. Normal conjunctivae.  Cardiovascular: Normal rate and regular rhythm.  Respiratory: Chest wall rises and falls symmetrically, without signs of respiratory distress.  Abdomen: Soft and non-tender.  Extremities: Warm and without edema. Calves supple, non-tender.  Psych/Behavior: Normal affect.    Neurological:    Mental status: Alert and oriented. Conversational and appropriate.       Cranial Nerves: VFF to confrontation. PERRL. EOMI without nystagmus. Facial STLT normal and symmetric. Strong, symmetric muscles of mastication. Facial strength full and symmetric. Hearing equal bilaterally to finger rub. Palate and uvula rise and fall normally in midline. Shoulder shrug 5/5 strength. Tongue midline.     Motor:    Upper:  Deltoids Triceps Biceps WE WF  FA    R 5/5 5/5 5/5 5/5 5/5 5/5 5/5    L 5/5 5/5 5/5 5/5 5/5 5/5 5/5      Lower:  HF KE KF DF PF EHL    R 5/5 5/5 5/5 5/5 5/5 5/5    L 5/5 5/5 5/5 5/5 5/5 5/5     Sensory: Intact sensation to  light touch and pinprick in all extremities.     Reflexes:      DTR: 2+ knees and biceps symmetrically.     Cartagena's: Negative.     Babinski's: Negative.     Clonus: Negative.     Gait:  Antalgic, slow and guarded      Spine:    Posture: Head well aligned over pelvis and shoulders in front and side views.  No focal or global spinal deformity visible on inspection.     Cervical:      ROM: Full with flexion, extension, lateral rotation and ear-to-shoulder bend.      Midline TTP: Negative.     Spurling's test: Negative.     Lhermitte's: Negative.    Thoracic:     Midline TTP: Negative    Lumbar:     Midline TTP: Negative     Straight Leg Test: Positive     Crossed Straight Leg Test: Positive    Diagnostic Results:  All imaging was independently reviewed by me.    MRI L spine, dated 5/23/24:  Recurrent R L5-S1 HNP causing severe LRS    MRI L spine, dated 3/5/24:  1. Large R L5-S1 HNP causing severe LRS and compressing S1 nerve root    ASSESSMENT/PLAN:     Problem List Items Addressed This Visit          Neuro    Acute right lumbar radiculopathy - Primary    Relevant Orders    Procedure Order to Pain Management    Case Request Operating Room: DISCECTOMY, SPINE, MINIMALLY INVASIVE, USING MICROSCOPE (Completed)    S/P lumbar laminectomy    Relevant Orders    Procedure Order to Pain Management    Case Request Operating Room: DISCECTOMY, SPINE, MINIMALLY INVASIVE, USING MICROSCOPE (Completed)       VISIT SUMMARY:  Patient is now approximately 2 months status post right MIS hemilaminotomy for microdiskectomy at L5-S1, who presents with recurrent severe right leg pain that resembles an S1 radiculopathy.  His recent MRI showed recurrent herniated disc causing lateral recess stenosis on the right at L5-S1 likely compressing the S1 nerve root.  I explained that his options for treatment include returned to surgery for redo diskectomy versus epidural steroid injection.  He is reluctant to undergo any further procedures but was  willing to consider an epidural steroid injection in an attempt to delay or avoid more surgery.  I explained to him that I would not recommend a fusion at this time.  It is my protocol to do a repeat diskectomy before a fusion, especially given his age and lack of spinal instability.    The plan will be to have him undergo a right-sided transforaminal L5-S1 JIGNESH soon and if no relief then proceed with diskectomy on 6/24/24.    PATIENT EDUCATION:  More than half the clinic visit was spent showing with patient the pertinent findings on imaging and educating the patient about natural history of the pathology.   We discussed options for treatment as well as the risks and benefits of each option.  All questions were answered.     The patient understands and agrees with the following plan of care.    - ordered right L5-S1 JIGNESH to be completed by pain medicine  - if no relief from JIGNESH we will plan for MIS diskectomy on 6/24/24    Time spent on this encounter: 40 minutes. This includes face-to-face time and non-face to face time preparing to see the patient (eg, review of tests), obtaining and/or reviewing separately obtained history, documenting clinical information in the electronic or other health record, independently interpreting results and communicating results to the patient/family/caregiver, or care coordinator.            .           No

## 2024-08-03 NOTE — ED ADULT NURSE NOTE - PRO INTERPRETER NEED 2
She fell forward off of it hitting her head on the ground.  She denies losing consciousness.  She denies using a blood thinner.  She has a large laceration to her right scalp and forehead but currently denies any significant pain or other injuries.    Limitations to history: None  Independent historian(s): None  Records Reviewed: Recent available ED and inpatient notes reviewed in EMR.    PMHx/PSHx:  Per HPI.   - has no past medical history on file.  - has no past surgical history on file.    Medications:  Reviewed in EMR. See EMR for complete list of medications and doses.    Allergies:  Patient has no allergy information on record.    Social History:  - Tobacco:  has no history on file for tobacco use.   - Alcohol:  has no history on file for alcohol use.   - Illicit Drugs:  has no history on file for drug use.     ROS:  Per HPI.       ???????????????????????????????????????????????????????????????  Triage Vitals:  T 36 °C (96.8 °F)  HR 69  /82  RR 18  O2 100 %      Physical Exam    General: Patient resting comfortably in bed, no acute distress, breathing easily, and appropriately conversational without confusion or gross mental status changes.  Head: Normocephalic.  A large 4 cm laceration to the right forehead/parietal scalp area producing a gaping wound.  Bleeding controlled.  Neck: No midline cervical spine tenderness with palpation.  FROM. No gross masses.   Eyes: EOMI. No scleral icterus or injection.  ENT: Moist mucous membranes, no apparent trauma or lesions.  CV: Regular rhythm. No murmurs, rubs, gallops appreciated. 2+ radial and DP pulses bilaterally.  No chest wall tenderness with palpation.  Resp: Clear to auscultation bilaterally. No  respiratory distress.   GI: Soft, non-distended.  No tenderness with palpation.    MSK: Full ROM in bilateral upper and lower extremities. No gross step offs or deformities.  EXT: No peripheral edema, contusions, or wounds.  Small skin tear to the distal end of the left second toe.  Skin: Warm and dry, no rashes or lesions.  Neuro: Alert and oriented.  Cranial nerves II-XII grossly intact.  No focal neurological deficits.  5 out of 5 strength in bilateral upper and lower extremity major muscle groups.  Sensation intact throughout.  Speech fluent.  Psych: Appropriate mood and behavior, converses and responds appropriately.    ???????????????????????????????????????????????????????????????  Labs:   Labs Reviewed   COMPREHENSIVE METABOLIC PANEL - Normal       Result Value    Glucose 95      Sodium 143      Potassium 3.7      Chloride 106      Bicarbonate 25      Anion Gap 16      Urea Nitrogen 14      Creatinine 0.72      eGFR 90      Calcium 9.4      Albumin 4.2      Alkaline Phosphatase 79      Total Protein 6.7      AST 19      Bilirubin, Total 0.6      ALT 28     PROTIME-INR - Normal    Protime 10.8      INR 1.0     CBC WITH AUTO DIFFERENTIAL    WBC 8.6      nRBC 0.0      RBC 4.40      Hemoglobin 13.1      Hematocrit 39.1      MCV 89      MCH 29.8      MCHC 33.5      RDW 13.5      Platelets 339      Neutrophils % 58.0      Immature Granulocytes %, Automated 0.2      Lymphocytes % 33.3      Monocytes % 7.2      Eosinophils % 0.8      Basophils % 0.5      Neutrophils Absolute 4.98      Immature Granulocytes Absolute, Automated 0.02      Lymphocytes Absolute 2.86      Monocytes Absolute 0.62      Eosinophils Absolute 0.07      Basophils Absolute 0.04     TYPE AND SCREEN    ABO TYPE O      Rh TYPE NEG      ANTIBODY SCREEN NEG          Imaging:   CT thoracic [id number]      CT chest abdomen pelvis w IV contrast   Final Result   No acute abnormality of the chest, abdomen and pelvis.        Nonobstructive kidney stones, with the largest in the left lower pole   measuring 1.1 cm. No hydroureteronephrosis.        MACRO:   None        Signed by: Eduardo Gonzalez 8/2/2024 8:44 PM   Dictation workstation:   [id number]      CT maxillofacial bones wo IV contrast   Final Result   Deep focal laceration to the right temporal/supraorbital region. No   radiopaque foreign bodies detected. No associated skull fracture.        No acute intracranial abnormality.        MACRO:   None        Signed by: Eduardo Gonzalez 8/2/2024 8:06 PM   Dictation workstation:   [id number]      CT cervical [id number]      CT 3D reconstruction   Final Result   Deep focal laceration to the right temporal/supraorbital region. No   radiopaque foreign bodies detected. No associated skull fracture.        No acute intracranial abnormality.        MACRO:   None        Signed by: Eduardo Gonzalez 8/2/2024 8:06 PM   Dictation workstation:   [id number]      CT head W O contrast trauma protocol   Final Result   Deep focal laceration to the right temporal/supraorbital region. No   radiopaque foreign bodies detected. No associated skull fracture.        No acute intracranial abnormality.        MACRO:   None        Signed by: Eduardo Gonzalez 8/2/2024 8:06 PM   Dictation workstation:    [id number]           EK: 30: Rate of 65 bpm, regular rhythm, normal axis, normal intervals, no evidence of ST segment elevations or depressions, no pattern T wave abnormalities.    MDM:  This is a 71-year-old female who presents to the emergency department after scooter accident.  She is hemodynamically stable and in no significant distress upon arrival.  Limited trauma activation called given the patient's large gaping wound on her head and the mechanism.  Her vital signs are within normal limits.  Physical exam is as above.  Primary survey is intact.  Secondary survey reveals her scalp laceration as well as a small skin tear on her second toe.  Patient is taken to CT scanner for pan scan given the mechanism and concern for injuries.  No intracranial hemorrhage or skull fracture on CT head.  No fracture or dislocation of the CT or L-spine.  No traumatic injury in the chest abdomen or pelvis.  No facial bone fractures.  Large scalp wound is repaired, please refer to the procedure portion of the note below.  Patient tolerated the procedure well.  Small amount of medical glue applied to the patient's left second toe skin tear.  Given no injuries and negative workup patient is appropriate for discharge home at this time.  Wound care instructions provided.  She will return to her primary care doctor or here for suture removal in 7 to 10 days.  All questions answered.  Strict return precautions provided.  Patient expressed understanding and agreed with the plan.  She remained hemodynamically stable and is discharged home.    Patient seen by and discussed with the attending emergency medicine physician.     ED Course:  Diagnoses as of 24   Fall, initial encounter   Facial laceration, initial encounter       Social Determinants Limiting Care:  Trauma    Disposition:  Discharge    Fantasma Alfaro DO   Emergency Medicine PGY-3  [facility]      Laceration  Repair    Performed by: Fantasma Alfaro DO  Authorized by: Eneida Randall DO    Consent:     Consent obtained:  Verbal  Anesthesia:     Anesthesia method:  Local infiltration    Local anesthetic:  Lidocaine 1% WITH epi  Laceration details:     Location:  Face    Face location:  R eyebrow    Length (cm):  4    Depth (mm):  5  Pre-procedure details:     Preparation:  Imaging obtained to evaluate for foreign bodies  Exploration:     Imaging obtained comment:  CT scan    Imaging outcome: foreign body not noted      Wound extent: no foreign bodies/material noted    Treatment:     Area cleansed with:  Saline    Amount of cleaning:  Standard    Irrigation solution:  Sterile saline    Irrigation method:  Syringe  Skin repair:     Repair method:  Sutures    Suture size:  4-0    Suture material:  Nylon    Suture technique:  Simple interrupted    Number of sutures:  8  Approximation:     Approximation:  Close  Repair type:     Repair type:  Simple  Post-procedure details:     Dressing:  Antibiotic ointment    Procedure completion:  Tolerated well, no immediate complications   ? SmartLinks last updated 8/2/2024 9:18 PM        Fantasma Alfaro DO  Resident  08/04/24 [id number]     English

## 2025-01-23 NOTE — ED ADULT NURSE NOTE - ED COMFORT CARE
Partially impaired: cannot see medication labels or newsprint, but can see obstacles in path, and the surrounding layout; can count fingers at arm's length
Patient informed/Warm blanket

## 2025-07-03 NOTE — ED ADULT NURSE NOTE - AS SC BRADEN NUTRITION
Detail Level: Detailed Quality 47: Advance Care Plan: Advance Care Planning discussed and documented; advance care plan or surrogate decision maker documented in the medical record. Quality 226: Preventive Care And Screening: Tobacco Use: Screening And Cessation Intervention: Patient screened for tobacco use and is an ex/non-smoker Quality 130: Documentation Of Current Medications In The Medical Record: Current Medications Documented (3) adequate